# Patient Record
Sex: MALE | Race: WHITE | NOT HISPANIC OR LATINO | Employment: UNEMPLOYED | URBAN - METROPOLITAN AREA
[De-identification: names, ages, dates, MRNs, and addresses within clinical notes are randomized per-mention and may not be internally consistent; named-entity substitution may affect disease eponyms.]

---

## 2022-03-03 ENCOUNTER — APPOINTMENT (OUTPATIENT)
Dept: CARDIOLOGY | Facility: HOSPITAL | Age: 57
End: 2022-03-03

## 2022-03-03 ENCOUNTER — HOSPITAL ENCOUNTER (INPATIENT)
Facility: HOSPITAL | Age: 57
LOS: 2 days | Discharge: HOME | End: 2022-03-05
Attending: INTERNAL MEDICINE | Admitting: INTERNAL MEDICINE

## 2022-03-03 DIAGNOSIS — I21.11 ST ELEVATION MYOCARDIAL INFARCTION INVOLVING RIGHT CORONARY ARTERY: Primary | ICD-10-CM

## 2022-03-03 PROBLEM — Z72.0 TOBACCO ABUSE: Status: ACTIVE | Noted: 2022-03-03

## 2022-03-03 PROBLEM — I21.3 STEMI (ST ELEVATION MYOCARDIAL INFARCTION): Status: ACTIVE | Noted: 2022-03-03

## 2022-03-03 PROBLEM — E78.5 HYPERLIPIDEMIA LDL GOAL <70: Status: ACTIVE | Noted: 2022-03-03

## 2022-03-03 LAB
ACT BLD: 249 SECONDS (ref 82–152)
ACT BLD: 356 SECONDS (ref 82–152)
BASOPHILS # BLD AUTO: 0.03 10*3/MM3 (ref 0–0.2)
BASOPHILS NFR BLD AUTO: 0.2 % (ref 0–1.5)
BH CV ECHO MEAS - AO MAX PG (FULL): 4.9 MMHG
BH CV ECHO MEAS - AO MAX PG: 11 MMHG
BH CV ECHO MEAS - AO MEAN PG (FULL): 3.1 MMHG
BH CV ECHO MEAS - AO MEAN PG: 6.3 MMHG
BH CV ECHO MEAS - AO ROOT AREA (BSA CORRECTED): 1.5
BH CV ECHO MEAS - AO ROOT AREA: 8.7 CM^2
BH CV ECHO MEAS - AO ROOT DIAM: 3.3 CM
BH CV ECHO MEAS - AO V2 MAX: 166.1 CM/SEC
BH CV ECHO MEAS - AO V2 MEAN: 115.2 CM/SEC
BH CV ECHO MEAS - AO V2 VTI: 32 CM
BH CV ECHO MEAS - AVA(I,A): 2.5 CM^2
BH CV ECHO MEAS - AVA(I,D): 2.5 CM^2
BH CV ECHO MEAS - AVA(V,A): 2.4 CM^2
BH CV ECHO MEAS - AVA(V,D): 2.4 CM^2
BH CV ECHO MEAS - BSA(HAYCOCK): 2.3 M^2
BH CV ECHO MEAS - BSA: 2.2 M^2
BH CV ECHO MEAS - BZI_BMI: 29.3 KILOGRAMS/M^2
BH CV ECHO MEAS - BZI_METRIC_HEIGHT: 185.4 CM
BH CV ECHO MEAS - BZI_METRIC_WEIGHT: 100.7 KG
BH CV ECHO MEAS - EDV(CUBED): 155.8 ML
BH CV ECHO MEAS - EDV(MOD-SP2): 129 ML
BH CV ECHO MEAS - EDV(MOD-SP4): 128 ML
BH CV ECHO MEAS - EDV(TEICH): 140.2 ML
BH CV ECHO MEAS - EF(CUBED): 61 %
BH CV ECHO MEAS - EF(MOD-BP): 58.8 %
BH CV ECHO MEAS - EF(MOD-SP2): 66.1 %
BH CV ECHO MEAS - EF(MOD-SP4): 55.4 %
BH CV ECHO MEAS - EF(TEICH): 52.1 %
BH CV ECHO MEAS - ESV(CUBED): 60.7 ML
BH CV ECHO MEAS - ESV(MOD-SP2): 43.7 ML
BH CV ECHO MEAS - ESV(MOD-SP4): 57.1 ML
BH CV ECHO MEAS - ESV(TEICH): 67.1 ML
BH CV ECHO MEAS - FS: 27 %
BH CV ECHO MEAS - IVS/LVPW: 0.96
BH CV ECHO MEAS - IVSD: 0.76 CM
BH CV ECHO MEAS - LA DIMENSION: 3.8 CM
BH CV ECHO MEAS - LA/AO: 1.2
BH CV ECHO MEAS - LAD MAJOR: 5.6 CM
BH CV ECHO MEAS - LAT PEAK E' VEL: 10.9 CM/SEC
BH CV ECHO MEAS - LATERAL E/E' RATIO: 8.5
BH CV ECHO MEAS - LV DIASTOLIC VOL/BSA (35-75): 56.9 ML/M^2
BH CV ECHO MEAS - LV IVRT: 0.09 SEC
BH CV ECHO MEAS - LV MASS(C)D: 146.9 GRAMS
BH CV ECHO MEAS - LV MASS(C)DI: 65.3 GRAMS/M^2
BH CV ECHO MEAS - LV MAX PG: 6.1 MMHG
BH CV ECHO MEAS - LV MEAN PG: 3.2 MMHG
BH CV ECHO MEAS - LV SYSTOLIC VOL/BSA (12-30): 25.4 ML/M^2
BH CV ECHO MEAS - LV V1 MAX: 123.4 CM/SEC
BH CV ECHO MEAS - LV V1 MEAN: 82.9 CM/SEC
BH CV ECHO MEAS - LV V1 VTI: 24.8 CM
BH CV ECHO MEAS - LVIDD: 5.4 CM
BH CV ECHO MEAS - LVIDS: 3.9 CM
BH CV ECHO MEAS - LVLD AP2: 9.7 CM
BH CV ECHO MEAS - LVLD AP4: 9.6 CM
BH CV ECHO MEAS - LVLS AP2: 7.4 CM
BH CV ECHO MEAS - LVLS AP4: 8.4 CM
BH CV ECHO MEAS - LVOT AREA (M): 3.1 CM^2
BH CV ECHO MEAS - LVOT AREA: 3.3 CM^2
BH CV ECHO MEAS - LVOT DIAM: 2 CM
BH CV ECHO MEAS - LVPWD: 0.79 CM
BH CV ECHO MEAS - MED PEAK E' VEL: 8.7 CM/SEC
BH CV ECHO MEAS - MEDIAL E/E' RATIO: 10.7
BH CV ECHO MEAS - MV A MAX VEL: 64.3 CM/SEC
BH CV ECHO MEAS - MV DEC SLOPE: 539.2 CM/SEC^2
BH CV ECHO MEAS - MV DEC TIME: 0.17 SEC
BH CV ECHO MEAS - MV E MAX VEL: 93 CM/SEC
BH CV ECHO MEAS - MV E/A: 1.4
BH CV ECHO MEAS - PA ACC TIME: 0.07 SEC
BH CV ECHO MEAS - PA PR(ACCEL): 48.9 MMHG
BH CV ECHO MEAS - SI(AO): 124.3 ML/M^2
BH CV ECHO MEAS - SI(CUBED): 42.3 ML/M^2
BH CV ECHO MEAS - SI(LVOT): 36.1 ML/M^2
BH CV ECHO MEAS - SI(MOD-SP2): 37.9 ML/M^2
BH CV ECHO MEAS - SI(MOD-SP4): 31.5 ML/M^2
BH CV ECHO MEAS - SI(TEICH): 32.5 ML/M^2
BH CV ECHO MEAS - SV(AO): 279.5 ML
BH CV ECHO MEAS - SV(CUBED): 95.1 ML
BH CV ECHO MEAS - SV(LVOT): 81.2 ML
BH CV ECHO MEAS - SV(MOD-SP2): 85.3 ML
BH CV ECHO MEAS - SV(MOD-SP4): 70.9 ML
BH CV ECHO MEAS - SV(TEICH): 73 ML
BH CV ECHO MEAS - TAPSE (>1.6): 2.4 CM
BH CV ECHO MEASUREMENTS AVERAGE E/E' RATIO: 9.49
BH CV XLRA - RV BASE: 3.5 CM
BH CV XLRA - RV LENGTH: 6.4 CM
BH CV XLRA - RV MID: 2.7 CM
BH CV XLRA - TDI S': 9.1 CM/SEC
CATH EF ESTIMATED: 50 %
CHOLEST SERPL-MCNC: 259 MG/DL (ref 0–200)
CREAT BLDA-MCNC: 0.6 MG/DL (ref 0.6–1.3)
DEPRECATED RDW RBC AUTO: 44.4 FL (ref 37–54)
EOSINOPHIL # BLD AUTO: 0.01 10*3/MM3 (ref 0–0.4)
EOSINOPHIL NFR BLD AUTO: 0.1 % (ref 0.3–6.2)
ERYTHROCYTE [DISTWIDTH] IN BLOOD BY AUTOMATED COUNT: 14.1 % (ref 12.3–15.4)
HBA1C MFR BLD: 5.7 % (ref 4.8–5.6)
HCT VFR BLD AUTO: 41.1 % (ref 37.5–51)
HDLC SERPL-MCNC: 43 MG/DL (ref 40–60)
HGB BLD-MCNC: 14.3 G/DL (ref 13–17.7)
IMM GRANULOCYTES # BLD AUTO: 0.07 10*3/MM3 (ref 0–0.05)
IMM GRANULOCYTES NFR BLD AUTO: 0.4 % (ref 0–0.5)
LDLC SERPL CALC-MCNC: 195 MG/DL (ref 0–100)
LDLC/HDLC SERPL: 4.49 {RATIO}
LEFT ATRIUM VOLUME INDEX: 29.2 ML/M^2
LEFT ATRIUM VOLUME: 65.6 ML
LV EF 2D ECHO EST: 60 %
LYMPHOCYTES # BLD AUTO: 1.93 10*3/MM3 (ref 0.7–3.1)
LYMPHOCYTES NFR BLD AUTO: 11.8 % (ref 19.6–45.3)
MAGNESIUM SERPL-MCNC: 1.8 MG/DL (ref 1.6–2.6)
MAXIMAL PREDICTED HEART RATE: 162 BPM
MCH RBC QN AUTO: 29.9 PG (ref 26.6–33)
MCHC RBC AUTO-ENTMCNC: 34.8 G/DL (ref 31.5–35.7)
MCV RBC AUTO: 86 FL (ref 79–97)
MONOCYTES # BLD AUTO: 1.08 10*3/MM3 (ref 0.1–0.9)
MONOCYTES NFR BLD AUTO: 6.6 % (ref 5–12)
NEUTROPHILS NFR BLD AUTO: 13.29 10*3/MM3 (ref 1.7–7)
NEUTROPHILS NFR BLD AUTO: 80.9 % (ref 42.7–76)
NRBC BLD AUTO-RTO: 0 /100 WBC (ref 0–0.2)
PLATELET # BLD AUTO: 244 10*3/MM3 (ref 140–450)
PMV BLD AUTO: 11.2 FL (ref 6–12)
RBC # BLD AUTO: 4.78 10*6/MM3 (ref 4.14–5.8)
SARS-COV-2 RDRP RESP QL NAA+PROBE: NORMAL
STRESS TARGET HR: 138 BPM
TRIGL SERPL-MCNC: 114 MG/DL (ref 0–150)
TROPONIN T SERPL-MCNC: 6.12 NG/ML (ref 0–0.03)
VLDLC SERPL-MCNC: 21 MG/DL (ref 5–40)
WBC NRBC COR # BLD: 16.41 10*3/MM3 (ref 3.4–10.8)

## 2022-03-03 PROCEDURE — 0 IOPAMIDOL PER 1 ML: Performed by: INTERNAL MEDICINE

## 2022-03-03 PROCEDURE — 87635 SARS-COV-2 COVID-19 AMP PRB: CPT | Performed by: INTERNAL MEDICINE

## 2022-03-03 PROCEDURE — 25010000002 PHENYLEPHRINE 10 MG/ML SOLUTION: Performed by: INTERNAL MEDICINE

## 2022-03-03 PROCEDURE — 92979 ENDOLUMINL IVUS OCT C EA: CPT | Performed by: INTERNAL MEDICINE

## 2022-03-03 PROCEDURE — C1769 GUIDE WIRE: HCPCS | Performed by: INTERNAL MEDICINE

## 2022-03-03 PROCEDURE — C1894 INTRO/SHEATH, NON-LASER: HCPCS | Performed by: INTERNAL MEDICINE

## 2022-03-03 PROCEDURE — 83735 ASSAY OF MAGNESIUM: CPT | Performed by: INTERNAL MEDICINE

## 2022-03-03 PROCEDURE — 63710000001 ONDANSETRON PER 8 MG: Performed by: INTERNAL MEDICINE

## 2022-03-03 PROCEDURE — C1725 CATH, TRANSLUMIN NON-LASER: HCPCS | Performed by: INTERNAL MEDICINE

## 2022-03-03 PROCEDURE — C9606 PERC D-E COR REVASC W AMI S: HCPCS | Performed by: INTERNAL MEDICINE

## 2022-03-03 PROCEDURE — 85347 COAGULATION TIME ACTIVATED: CPT

## 2022-03-03 PROCEDURE — 80061 LIPID PANEL: CPT | Performed by: INTERNAL MEDICINE

## 2022-03-03 PROCEDURE — 92978 ENDOLUMINL IVUS OCT C 1ST: CPT | Performed by: INTERNAL MEDICINE

## 2022-03-03 PROCEDURE — 82565 ASSAY OF CREATININE: CPT

## 2022-03-03 PROCEDURE — 85025 COMPLETE CBC W/AUTO DIFF WBC: CPT | Performed by: INTERNAL MEDICINE

## 2022-03-03 PROCEDURE — 99223 1ST HOSP IP/OBS HIGH 75: CPT | Performed by: INTERNAL MEDICINE

## 2022-03-03 PROCEDURE — 83036 HEMOGLOBIN GLYCOSYLATED A1C: CPT | Performed by: INTERNAL MEDICINE

## 2022-03-03 PROCEDURE — 25010000002 MORPHINE PER 10 MG: Performed by: INTERNAL MEDICINE

## 2022-03-03 PROCEDURE — B2111ZZ FLUOROSCOPY OF MULTIPLE CORONARY ARTERIES USING LOW OSMOLAR CONTRAST: ICD-10-PCS | Performed by: INTERNAL MEDICINE

## 2022-03-03 PROCEDURE — 99232 SBSQ HOSP IP/OBS MODERATE 35: CPT | Performed by: INTERNAL MEDICINE

## 2022-03-03 PROCEDURE — C1887 CATHETER, GUIDING: HCPCS | Performed by: INTERNAL MEDICINE

## 2022-03-03 PROCEDURE — 93458 L HRT ARTERY/VENTRICLE ANGIO: CPT | Performed by: INTERNAL MEDICINE

## 2022-03-03 PROCEDURE — 25010000002 MIDAZOLAM PER 1 MG: Performed by: INTERNAL MEDICINE

## 2022-03-03 PROCEDURE — B221Z2Z COMPUTERIZED TOMOGRAPHY (CT SCAN) OF MULTIPLE CORONARY ARTERIES USING INTRAVASCULAR OPTICAL COHERENCE: ICD-10-PCS | Performed by: INTERNAL MEDICINE

## 2022-03-03 PROCEDURE — 25010000002 ATROPINE PER 0.01 MG: Performed by: INTERNAL MEDICINE

## 2022-03-03 PROCEDURE — 93010 ELECTROCARDIOGRAM REPORT: CPT | Performed by: INTERNAL MEDICINE

## 2022-03-03 PROCEDURE — 93306 TTE W/DOPPLER COMPLETE: CPT | Performed by: INTERNAL MEDICINE

## 2022-03-03 PROCEDURE — C1874 STENT, COATED/COV W/DEL SYS: HCPCS | Performed by: INTERNAL MEDICINE

## 2022-03-03 PROCEDURE — 25010000002 FENTANYL CITRATE (PF) 50 MCG/ML SOLUTION: Performed by: INTERNAL MEDICINE

## 2022-03-03 PROCEDURE — 92928 PRQ TCAT PLMT NTRAC ST 1 LES: CPT | Performed by: INTERNAL MEDICINE

## 2022-03-03 PROCEDURE — 92941 PRQ TRLML REVSC TOT OCCL AMI: CPT | Performed by: INTERNAL MEDICINE

## 2022-03-03 PROCEDURE — C1753 CATH, INTRAVAS ULTRASOUND: HCPCS | Performed by: INTERNAL MEDICINE

## 2022-03-03 PROCEDURE — 4A023N7 MEASUREMENT OF CARDIAC SAMPLING AND PRESSURE, LEFT HEART, PERCUTANEOUS APPROACH: ICD-10-PCS | Performed by: INTERNAL MEDICINE

## 2022-03-03 PROCEDURE — 25010000002 HEPARIN (PORCINE) PER 1000 UNITS: Performed by: INTERNAL MEDICINE

## 2022-03-03 PROCEDURE — 93005 ELECTROCARDIOGRAM TRACING: CPT | Performed by: INTERNAL MEDICINE

## 2022-03-03 PROCEDURE — 0 MAGNESIUM SULFATE 4 GM/100ML SOLUTION: Performed by: INTERNAL MEDICINE

## 2022-03-03 PROCEDURE — 93306 TTE W/DOPPLER COMPLETE: CPT

## 2022-03-03 PROCEDURE — 027137Z DILATION OF CORONARY ARTERY, TWO ARTERIES WITH FOUR OR MORE DRUG-ELUTING INTRALUMINAL DEVICES, PERCUTANEOUS APPROACH: ICD-10-PCS | Performed by: INTERNAL MEDICINE

## 2022-03-03 PROCEDURE — 84484 ASSAY OF TROPONIN QUANT: CPT | Performed by: INTERNAL MEDICINE

## 2022-03-03 PROCEDURE — C9600 PERC DRUG-EL COR STENT SING: HCPCS | Performed by: INTERNAL MEDICINE

## 2022-03-03 DEVICE — XIENCE SKYPOINT™ EVEROLIMUS ELUTING CORONARY STENT SYSTEM 4.00 MM X 38 MM / RAPID-EXCHANGE
Type: IMPLANTABLE DEVICE | Status: FUNCTIONAL
Brand: XIENCE SKYPOINT™

## 2022-03-03 DEVICE — XIENCE SKYPOINT™ EVEROLIMUS ELUTING CORONARY STENT SYSTEM 3.50 MM X 23 MM / RAPID-EXCHANGE
Type: IMPLANTABLE DEVICE | Status: FUNCTIONAL
Brand: XIENCE SKYPOINT™

## 2022-03-03 DEVICE — XIENCE SKYPOINT™ EVEROLIMUS ELUTING CORONARY STENT SYSTEM 3.25 MM X 38 MM / RAPID-EXCHANGE
Type: IMPLANTABLE DEVICE | Status: FUNCTIONAL
Brand: XIENCE SKYPOINT™

## 2022-03-03 RX ORDER — MIDAZOLAM HYDROCHLORIDE 1 MG/ML
INJECTION INTRAMUSCULAR; INTRAVENOUS AS NEEDED
Status: DISCONTINUED | OUTPATIENT
Start: 2022-03-03 | End: 2022-03-03 | Stop reason: HOSPADM

## 2022-03-03 RX ORDER — POTASSIUM CHLORIDE 1.5 G/1.77G
40 POWDER, FOR SOLUTION ORAL AS NEEDED
Status: DISCONTINUED | OUTPATIENT
Start: 2022-03-03 | End: 2022-03-05 | Stop reason: HOSPADM

## 2022-03-03 RX ORDER — BISACODYL 5 MG/1
5 TABLET, DELAYED RELEASE ORAL DAILY PRN
Status: DISCONTINUED | OUTPATIENT
Start: 2022-03-03 | End: 2022-03-05 | Stop reason: HOSPADM

## 2022-03-03 RX ORDER — POTASSIUM CHLORIDE 750 MG/1
40 CAPSULE, EXTENDED RELEASE ORAL AS NEEDED
Status: DISCONTINUED | OUTPATIENT
Start: 2022-03-03 | End: 2022-03-05 | Stop reason: HOSPADM

## 2022-03-03 RX ORDER — ATORVASTATIN CALCIUM 40 MG/1
80 TABLET, FILM COATED ORAL NIGHTLY
Status: DISCONTINUED | OUTPATIENT
Start: 2022-03-03 | End: 2022-03-05 | Stop reason: HOSPADM

## 2022-03-03 RX ORDER — VALSARTAN 80 MG/1
40 TABLET ORAL DAILY
Status: DISCONTINUED | OUTPATIENT
Start: 2022-03-03 | End: 2022-03-05 | Stop reason: HOSPADM

## 2022-03-03 RX ORDER — LISINOPRIL 10 MG/1
10 TABLET ORAL DAILY
COMMUNITY
End: 2022-04-26 | Stop reason: ALTCHOICE

## 2022-03-03 RX ORDER — ACETAMINOPHEN 325 MG/1
650 TABLET ORAL EVERY 4 HOURS PRN
Status: DISCONTINUED | OUTPATIENT
Start: 2022-03-03 | End: 2022-03-05 | Stop reason: HOSPADM

## 2022-03-03 RX ORDER — HYDROCODONE BITARTRATE AND ACETAMINOPHEN 7.5; 325 MG/1; MG/1
1 TABLET ORAL EVERY 4 HOURS PRN
Status: DISCONTINUED | OUTPATIENT
Start: 2022-03-03 | End: 2022-03-05 | Stop reason: HOSPADM

## 2022-03-03 RX ORDER — ONDANSETRON 4 MG/1
4 TABLET, FILM COATED ORAL EVERY 6 HOURS PRN
Status: DISCONTINUED | OUTPATIENT
Start: 2022-03-03 | End: 2022-03-05 | Stop reason: HOSPADM

## 2022-03-03 RX ORDER — POLYETHYLENE GLYCOL 3350 17 G/17G
17 POWDER, FOR SOLUTION ORAL DAILY PRN
Status: DISCONTINUED | OUTPATIENT
Start: 2022-03-03 | End: 2022-03-05 | Stop reason: HOSPADM

## 2022-03-03 RX ORDER — ASPIRIN 81 MG/1
81 TABLET ORAL DAILY
Status: DISCONTINUED | OUTPATIENT
Start: 2022-03-03 | End: 2022-03-05 | Stop reason: HOSPADM

## 2022-03-03 RX ORDER — BISACODYL 10 MG
10 SUPPOSITORY, RECTAL RECTAL DAILY PRN
Status: DISCONTINUED | OUTPATIENT
Start: 2022-03-03 | End: 2022-03-05 | Stop reason: HOSPADM

## 2022-03-03 RX ORDER — AMOXICILLIN 250 MG
2 CAPSULE ORAL 2 TIMES DAILY
Status: DISCONTINUED | OUTPATIENT
Start: 2022-03-03 | End: 2022-03-05 | Stop reason: HOSPADM

## 2022-03-03 RX ORDER — SODIUM CHLORIDE 0.9 % (FLUSH) 0.9 %
10 SYRINGE (ML) INJECTION AS NEEDED
Status: DISCONTINUED | OUTPATIENT
Start: 2022-03-03 | End: 2022-03-05 | Stop reason: HOSPADM

## 2022-03-03 RX ORDER — MORPHINE SULFATE 2 MG/ML
2 INJECTION, SOLUTION INTRAMUSCULAR; INTRAVENOUS EVERY 4 HOURS PRN
Status: DISCONTINUED | OUTPATIENT
Start: 2022-03-03 | End: 2022-03-05 | Stop reason: HOSPADM

## 2022-03-03 RX ORDER — HEPARIN SODIUM 1000 [USP'U]/ML
INJECTION, SOLUTION INTRAVENOUS; SUBCUTANEOUS AS NEEDED
Status: DISCONTINUED | OUTPATIENT
Start: 2022-03-03 | End: 2022-03-03 | Stop reason: HOSPADM

## 2022-03-03 RX ORDER — FENTANYL CITRATE 50 UG/ML
INJECTION, SOLUTION INTRAMUSCULAR; INTRAVENOUS AS NEEDED
Status: DISCONTINUED | OUTPATIENT
Start: 2022-03-03 | End: 2022-03-03 | Stop reason: HOSPADM

## 2022-03-03 RX ORDER — PHENYLEPHRINE HYDROCHLORIDE 10 MG/ML
INJECTION INTRAVENOUS AS NEEDED
Status: DISCONTINUED | OUTPATIENT
Start: 2022-03-03 | End: 2022-03-03 | Stop reason: HOSPADM

## 2022-03-03 RX ORDER — SODIUM CHLORIDE 0.9 % (FLUSH) 0.9 %
10 SYRINGE (ML) INJECTION EVERY 12 HOURS SCHEDULED
Status: DISCONTINUED | OUTPATIENT
Start: 2022-03-03 | End: 2022-03-05 | Stop reason: HOSPADM

## 2022-03-03 RX ORDER — MAGNESIUM SULFATE HEPTAHYDRATE 40 MG/ML
2 INJECTION, SOLUTION INTRAVENOUS AS NEEDED
Status: DISCONTINUED | OUTPATIENT
Start: 2022-03-03 | End: 2022-03-05 | Stop reason: HOSPADM

## 2022-03-03 RX ORDER — ATROPINE SULFATE 1 MG/ML
INJECTION, SOLUTION INTRAMUSCULAR; INTRAVENOUS; SUBCUTANEOUS AS NEEDED
Status: DISCONTINUED | OUTPATIENT
Start: 2022-03-03 | End: 2022-03-03 | Stop reason: HOSPADM

## 2022-03-03 RX ORDER — LIDOCAINE HYDROCHLORIDE 10 MG/ML
INJECTION, SOLUTION EPIDURAL; INFILTRATION; INTRACAUDAL; PERINEURAL AS NEEDED
Status: DISCONTINUED | OUTPATIENT
Start: 2022-03-03 | End: 2022-03-03 | Stop reason: HOSPADM

## 2022-03-03 RX ORDER — MAGNESIUM SULFATE HEPTAHYDRATE 40 MG/ML
4 INJECTION, SOLUTION INTRAVENOUS AS NEEDED
Status: DISCONTINUED | OUTPATIENT
Start: 2022-03-03 | End: 2022-03-05 | Stop reason: HOSPADM

## 2022-03-03 RX ORDER — ONDANSETRON 2 MG/ML
4 INJECTION INTRAMUSCULAR; INTRAVENOUS EVERY 6 HOURS PRN
Status: DISCONTINUED | OUTPATIENT
Start: 2022-03-03 | End: 2022-03-05 | Stop reason: HOSPADM

## 2022-03-03 RX ADMIN — TICAGRELOR 90 MG: 90 TABLET ORAL at 12:20

## 2022-03-03 RX ADMIN — MAGNESIUM SULFATE HEPTAHYDRATE 4 G: 40 INJECTION, SOLUTION INTRAVENOUS at 14:46

## 2022-03-03 RX ADMIN — Medication 10 ML: at 12:21

## 2022-03-03 RX ADMIN — MORPHINE SULFATE 2 MG: 2 INJECTION, SOLUTION INTRAMUSCULAR; INTRAVENOUS at 08:32

## 2022-03-03 RX ADMIN — TICAGRELOR 90 MG: 90 TABLET ORAL at 21:27

## 2022-03-03 RX ADMIN — ASPIRIN 81 MG: 81 TABLET, COATED ORAL at 12:20

## 2022-03-03 RX ADMIN — ATORVASTATIN CALCIUM 80 MG: 40 TABLET, FILM COATED ORAL at 21:27

## 2022-03-03 RX ADMIN — ONDANSETRON HYDROCHLORIDE 4 MG: 4 TABLET, FILM COATED ORAL at 12:36

## 2022-03-03 RX ADMIN — ACETAMINOPHEN 650 MG: 325 TABLET, FILM COATED ORAL at 12:24

## 2022-03-03 RX ADMIN — SENNOSIDES AND DOCUSATE SODIUM 2 TABLET: 50; 8.6 TABLET ORAL at 21:27

## 2022-03-03 NOTE — PROGRESS NOTES
LIZBETH Samson   APRABDELRAHMAN NOTE    Ritesh Quevedo is a 56 y.o. male who began having substernal chest pain on 3/2/22 which he contributed to gas pain and did not seek care at that time. He woke up on 3/3/22 with crushing and burning chest pain that was worse, rated 7/10 and associated with nausea and vomiting. He presented to the ED at [facility] where EKG showed inferior wall MI.  Code AMI was activated and the patient was flown to [facility] and taken emergently to the cath lab where he underwent PCI with overlapping KYUNG to the distal RCA, mid RCA, proximal RCA, and proximal ramus. LVEF was 50% with inferior akinesis. He was started on a Heparin gtt and given a dose of Brilinta.     He denied dizziness, fatigue, SOA, or changes in functional abilities.  He smokes 1/2 pack a day since he was 14 years old and admits to drinking 1 shot of whiskey every weekend. His father and maternal grandparents had cardiac disease.     On arrival to ICU he is alert, oriented and denies chest pain, nausea, or shortness of air. He has a TR band in place on the right wrist. Right hand and fingers appear dusky with no palpable radial pulse and cap refill is prolonged at > 5 seconds. TR band decreased by 2 mL of air with pulse regained and hand color/cap refill improving but site began to bleed. 1 mL of air replaced with cessation of bleeding and pulse remains palpable with adequate cap refill.      []   Information obtained by review of electronic health records.(non-face-to-face)  [x]   Information obtained by face-to-face contact with the patient.      INTENSIVIST   HOSPITAL VISIT NOTE     Hospital:  LOS: 0 days        XAVIER Awad, 56 y.o. male is followed for:No chief complaint on file.       STEMI (ST elevation myocardial infarction) (HCC)    Hyperlipidemia LDL goal <70    Tobacco abuse    As an Intensivist, we provide an integrated approach to the ICU patient and family, medical management of  comorbid conditions, including but not limited to electrolytes, glycemic control, organ dysfunction, lead interdisciplinary rounds and coordinate the care with all other services, including those from other specialists.     HPI:  POD: Day of Surgery     Ritesh is a 56 y.o. male, who was transferred to this Hospital from [facility]  on 3/3/2022 because of AMI.     His initial evaluation at the ED suggested and inferior wall MI.    He started having Chest pain this morning.    He is a smoker.    For further details see note above.    I saw him in 2B ICU after his LHC. He is doing much better, still some soreness on his chest. No respiratory distress.    The patient has started, but not completed, their COVID-19 vaccination series.        PMH: He  has a past medical history of Hypertension.   PSxH: He  has a past surgical history that includes Knee surgery.     Medications:  No current facility-administered medications on file prior to encounter.     Current Outpatient Medications on File Prior to Encounter   Medication Sig   • lisinopril (PRINIVIL,ZESTRIL) 10 MG tablet Take 10 mg by mouth Daily.        Allergies: He has No Known Allergies.   FH: His family history includes Heart disease in his father, paternal grandfather, and paternal grandmother.   SH: He  reports that he has been smoking cigarettes. He has a 21.00 pack-year smoking history. He has never used smokeless tobacco. He reports current alcohol use of about 1.0 standard drink of alcohol per week. He reports that he does not use drugs.     The patient's relevant past medical, surgical and social history were reviewed and updated in Epic as appropriate.        History     Last Reviewed by Zafar Dukes MD on 3/3/2022 at  4:59 PM    Sections Reviewed    Medical, Family, Surgical, Tobacco, Alcohol, Drug Use, Sexual Activity,   Social Documentation      Problem list reviewed by Zafar Dukes MD on 3/3/2022 at  4:59 PM  Medicines reviewed by  Zafar Dukes MD on 3/3/2022 at  4:59 PM  Allergies reviewed by Zafar Dukes MD on 3/3/2022 at  4:59 PM       Review of Systems  As described in the HPI. All other systems reviewed and are negative.       O     Vitals:  Temp: 98.4 °F (36.9 °C) (03/03/22 1615) Temp  Min: 98 °F (36.7 °C)  Max: 98.4 °F (36.9 °C)   Temp core:      BP: 114/77 (03/03/22 1615) BP  Min: 90/63  Max: 156/88   Pulse: 76 (03/03/22 1615) Pulse  Min: 50  Max: 85   Resp: 12 (03/03/22 1615) Resp  Min: 12  Max: 20   SpO2: 95 % (03/03/22 1615) SpO2  Min: 89 %  Max: 98 %   Device: nasal cannula (03/03/22 1615)    Flow Rate: 2 (03/03/22 1615) Flow (L/min)  Min: 2  Max: 3     Intake/Ouptut 24 hrs (7:00AM - 6:59 AM)    Intake/Output Summary (Last 24 hours) at 3/3/2022 1659  Last data filed at 3/3/2022 1116  Gross per 24 hour   Intake --   Output 200 ml   Net -200 ml        Physical Examination    Telemetry:  Rhythm: sinus bradycardia (03/03/22 1600)         Constitutional:  No acute distress.   Head: Normocephalic.   ENMT: No oral lesions.   Neck: No adenopathy. Supple.  Trachea midline.   Cardiovascular: RRR.   Normal heart sounds.  No murmurs, gallop or rub.   Respiratory: Normal breath sounds  No adventitious sounds.   Abdominal:  Soft with no tenderness.  No distension.   No HSM.   Extremities: Warm.  Dry.  No cyanosis.  No Edema   Neurological/Psych:   Alert, Oriented, Cooperative.  Best Eye Response: 4-->(E4) spontaneous (03/03/22 1600)  Best Motor Response: 6-->(M6) obeys commands (03/03/22 1600)  Best Verbal Response: 5-->(V5) oriented (03/03/22 1600)  Lorman Coma Scale Score: 15 (03/03/22 1600)     Results Reviewed:  Laboratory  Microbiology  Radiology  Pathology    Hematology:        Chemistry:  Estimated Creatinine Clearance: 169.5 mL/min (by C-G formula based on SCr of 0.6 mg/dL).  Results from last 7 days   Lab Units 03/03/22  0843   CREATININE mg/dL 0.60     Results from last 7 days   Lab Units 03/03/22  1205   MAGNESIUM mg/dL 1.8      Cardiac Labs:  Results from last 7 days   Lab Units 03/03/22  1205   TROPONIN T ng/mL 6.120*     COVID-19  Lab Results   Lab Value Date/Time    COVID19 Presumptive Negative 03/03/2022 0857     Images:  No radiology results for the last day    Echo:    Results: Reviewed.  I reviewed the patient's new laboratory and imaging results.  I independently reviewed the patient's new images.    Medications: Reviewed.    Assessment/Plan   A / P     Ritesh is a 56 y.o. male admitted on 3/3/2022 with ST elevation myocardial infarction involving right coronary artery (HCC) [I21.11]:     STEMI  [facility] 03/03/22: Severe 2-vessel CAD (RCA, ramus).    Successful PCI of proximal to distal RCA and RPL using overlapping Xience KYUNG.    Successful PCI of ramus branch with Xience 3.5 x 23 mm KYUNG  HTN on ACEI  Dyslipidemia on statins    Lab Results   Lab Value Date/Time    CHOL 259 (H) 03/03/2022 1205    HDL 43 03/03/2022 1205     (H) 03/03/2022 1205    TRIG 114 03/03/2022 1205      Tobacco use  No history of DM        No results found for: HGBA1C    Advance Directives: Code Status and Medical Interventions:   Ordered at: 03/03/22 1008     Code Status (Patient has no pulse and is not breathing):    CPR (Attempt to Resuscitate)     Medical Interventions (Patient has pulse or is breathing):    Full Support        Plan:    ICU admission after [facility].  Monitor arrhythmias.  Management of Electrolytes Disorders  Tobacco Cessation  A1c and TSH in AM  Disposition: Keep in ICU.   Labs in AM    Plan of care and goals reviewed during interdisciplinary rounds.  I discussed the patient's findings and my recommendations with patient    Level of Risk is High due to:  illness with threat to life or bodily function.     Time: 50 minutes, in direct patient care, with the patient and/or on the loomis coordinating care with other health care providers.     I have spent > 50% percent of this time, counseling and discussing management.     []  Primary  Attending  [x]  Consultant

## 2022-03-03 NOTE — H&P
Cardiology H & P  [facility] Cardiology      Reason for consultation:  · CODE AMI    Requesting provider: [facility]    IDENTIFICATION: 56 year old from [facility] Problems    Diagnosis  POA   • **STEMI (ST elevation myocardial infarction) (HCC) [I21.3]  Yes     Priority: High   • Hyperlipidemia LDL goal <70 [E78.5]  Unknown              56-year-old gentleman with no prior cardiac history presented to [facility] with 24 hours of substernal chest discomfort.  Discomfort was most notable in his throat and upper chest.  He developed nausea and vomiting.  He presented to [facility] with inferior ST elevation.  Code AMI was activated.  The patient was flown directly to the cardiac catheterization lab at [facility] with ongoing pain, nausea, vomiting but hemodynamically stable.    Cardiac catheterization was performed via the right radial approach.  He was found to have occlusion of the distal RCA which was treated with multiple overlapping drug-eluting stents from proximal to distal vessel.  Additionally, he had a high-grade stenosis of a large ramus intermediate branch.  This was treated with a drug-eluting stent as well.  LVEF 50% with inferior akinesis.        Not on File    Prior to Admission medications    Not on File       History reviewed. No pertinent past medical history.    History reviewed. No pertinent surgical history.    History reviewed. No pertinent family history.    Social History     Tobacco Use   Smoking Status Not on file   Smokeless Tobacco Not on file       Social History     Substance and Sexual Activity   Alcohol Use None         Review of Systems:   Review of Systems   Unable to perform ROS: acuity of condition   Cardiovascular: Positive for chest pain.   Gastrointestinal: Positive for nausea and vomiting.            Vital Sign Min/Max for last 24 hours  No data recorded   BP  Min: 140/86  Max: 140/86   Pulse  Min: 75  Max: 75   Resp  Min: 20  Max: 20    SpO2  Min: 98 %  Max: 98 %   Flow (L/min)  Min: 3  Max: 3    No intake or output data in the 24 hours ending 03/03/22 0958          Constitutional:       Appearance: Healthy appearance. Obese.   Eyes:      General: Lids are normal. No scleral icterus.     Conjunctiva/sclera: Conjunctivae normal.   HENT:      Head: Normocephalic and atraumatic.   Neck:      Thyroid: No thyromegaly.      Vascular: No carotid bruit or JVD.   Pulmonary:      Effort: Pulmonary effort is normal.      Breath sounds: Normal breath sounds. No wheezing. No rhonchi. No rales.   Cardiovascular:      Normal rate. Regular rhythm.      Murmurs: There is no murmur.      No gallop. No rub.   Pulses:     Intact distal pulses.   Edema:     Peripheral edema absent.   Abdominal:      General: There is no distension.      Palpations: Abdomen is soft. There is no abdominal mass.   Musculoskeletal:      Cervical back: Normal range of motion. Skin:     General: Skin is warm and dry.      Findings: No rash.   Neurological:      General: No focal deficit present.      Mental Status: Alert and oriented to person, place, and time.      Gait: Gait is intact.   Psychiatric:         Attention and Perception: Attention normal.         Mood and Affect: Mood normal.         Behavior: Behavior normal.          Tele: Sinus    DATA REVIEW:    EKG (3/3/2022): Sinus with inferior ST elevation                               Inferior STEMI  · Status post PCI of RCA as well as ramus intermediate branch  · LVEF 50%    Hyperlipidemia goal LDL <70  · We will start atorvastatin 80 mg nightly                 · Admit to ICU  · Metoprolol 25 mg twice daily  · Valsartan 40 mg daily  · Low-dose aspirin and ticagrelor until 3/2023      Electronically signed by Jean Cooper IV, MD, 03/03/22, 7:16 AM EST.

## 2022-03-03 NOTE — PLAN OF CARE
Goal Outcome Evaluation:              Outcome Summary: Pt VSS throughout shift. Pt was asymptomatically bradycardic, provider notified. Chest pain has progressively improved this shift, now verbalized a 0-1. Radial site CDI. All questions and concerns adressed at bedside.

## 2022-03-04 LAB
ANION GAP SERPL CALCULATED.3IONS-SCNC: 11 MMOL/L (ref 5–15)
BUN SERPL-MCNC: 10 MG/DL (ref 6–20)
BUN/CREAT SERPL: 10.4 (ref 7–25)
CALCIUM SPEC-SCNC: 9.3 MG/DL (ref 8.6–10.5)
CHLORIDE SERPL-SCNC: 98 MMOL/L (ref 98–107)
CO2 SERPL-SCNC: 26 MMOL/L (ref 22–29)
CREAT SERPL-MCNC: 0.96 MG/DL (ref 0.76–1.27)
EGFRCR SERPLBLD CKD-EPI 2021: 91.6 ML/MIN/1.73
GLUCOSE SERPL-MCNC: 109 MG/DL (ref 65–99)
MAGNESIUM SERPL-MCNC: 2.4 MG/DL (ref 1.6–2.6)
POTASSIUM SERPL-SCNC: 4.2 MMOL/L (ref 3.5–5.2)
SODIUM SERPL-SCNC: 135 MMOL/L (ref 136–145)
TSH SERPL DL<=0.05 MIU/L-ACNC: 0.77 UIU/ML (ref 0.27–4.2)

## 2022-03-04 PROCEDURE — 99231 SBSQ HOSP IP/OBS SF/LOW 25: CPT | Performed by: INTERNAL MEDICINE

## 2022-03-04 PROCEDURE — 83735 ASSAY OF MAGNESIUM: CPT | Performed by: INTERNAL MEDICINE

## 2022-03-04 PROCEDURE — 80048 BASIC METABOLIC PNL TOTAL CA: CPT | Performed by: INTERNAL MEDICINE

## 2022-03-04 PROCEDURE — 84443 ASSAY THYROID STIM HORMONE: CPT | Performed by: INTERNAL MEDICINE

## 2022-03-04 PROCEDURE — 99232 SBSQ HOSP IP/OBS MODERATE 35: CPT | Performed by: PHYSICIAN ASSISTANT

## 2022-03-04 PROCEDURE — 93005 ELECTROCARDIOGRAM TRACING: CPT | Performed by: PHYSICIAN ASSISTANT

## 2022-03-04 RX ADMIN — TICAGRELOR 90 MG: 90 TABLET ORAL at 08:37

## 2022-03-04 RX ADMIN — TICAGRELOR 90 MG: 90 TABLET ORAL at 20:29

## 2022-03-04 RX ADMIN — METOPROLOL TARTRATE 25 MG: 25 TABLET, FILM COATED ORAL at 20:29

## 2022-03-04 RX ADMIN — METOPROLOL TARTRATE 25 MG: 25 TABLET, FILM COATED ORAL at 09:55

## 2022-03-04 RX ADMIN — SENNOSIDES AND DOCUSATE SODIUM 2 TABLET: 50; 8.6 TABLET ORAL at 08:37

## 2022-03-04 RX ADMIN — SENNOSIDES AND DOCUSATE SODIUM 2 TABLET: 50; 8.6 TABLET ORAL at 20:29

## 2022-03-04 RX ADMIN — ASPIRIN 81 MG: 81 TABLET, COATED ORAL at 08:37

## 2022-03-04 RX ADMIN — VALSARTAN 40 MG: 80 TABLET, FILM COATED ORAL at 08:39

## 2022-03-04 RX ADMIN — ATORVASTATIN CALCIUM 80 MG: 40 TABLET, FILM COATED ORAL at 20:28

## 2022-03-04 NOTE — PROGRESS NOTES
" [facility] at [facility] - Progress Note    Ritesh Quevedo  1965  [id number]    03/04/22, 08:43 EST      Chief Complaint: Following for CAD.  S/P Inferior MI with PCI of RCA    Subjective:   Patient remains in ICU, stable overnight without symptoms.  Sitting up in bed - has not been up ambulating yet.  Denies chest pain, denies dyspnea.  Not craving cigarettes at this time.  Asking about work release/forms    Review of Systems:  Pertinent positives are listed above and in physical exam.  All others have been reviewed and are negative.    aspirin, 81 mg, Oral, Daily  atorvastatin, 80 mg, Oral, Nightly  metoprolol tartrate, 25 mg, Oral, Q12H  pharmacy consult - MTM, , Does not apply, Daily  Pharmacy Meds to Bed Consult, , Does not apply, Daily  senna-docusate sodium, 2 tablet, Oral, BID  sodium chloride, 10 mL, Intravenous, Q12H  ticagrelor, 90 mg, Oral, BID  valsartan, 40 mg, Oral, Daily        Objective:  Vitals:   height is 187.2 cm (73.7\") and weight is 101 kg (222 lb 10.6 oz). His oral temperature is 98.3 °F (36.8 °C). His blood pressure is 114/70 and his pulse is 82. His respiration is 16 and oxygen saturation is 91%.       Intake/Output Summary (Last 24 hours) at 3/4/2022 0843  Last data filed at 3/4/2022 0500  Gross per 24 hour   Intake 840 ml   Output 1400 ml   Net -560 ml       Physical Exam:  General:  WN, NAD, A and O x3.  CV:  Normal S1,S2. No murmur, rub, or gallop.  Resp:  CTA Chris, equal, nonlabored.  Abd:  Soft, + BS, no organomegaly. Nontender to palpation.  Extrem:  No edema BLE, 2+ pedal/PT pulses.   Right wrist stable without erythema/bruising.  Dressing personally removed.         Results from last 7 days   Lab Units 03/03/22  1442   WBC 10*3/mm3 16.41*   HEMOGLOBIN g/dL 14.3   HEMATOCRIT % 41.1   PLATELETS 10*3/mm3 244     Results from last 7 days   Lab Units 03/04/22  0425   SODIUM mmol/L 135*   POTASSIUM mmol/L 4.2   CHLORIDE mmol/L 98   CO2 mmol/L 26.0   BUN mg/dL 10 "   CREATININE mg/dL 0.96   CALCIUM mg/dL 9.3   GLUCOSE mg/dL 109*         Results from last 7 days   Lab Units 03/04/22  0425   TSH uIU/mL 0.767     Results from last 7 days   Lab Units 03/03/22  1205   CHOLESTEROL mg/dL 259*   TRIGLYCERIDES mg/dL 114   HDL CHOL mg/dL 43   LDL CHOL mg/dL 195*           Tele:  NSR    Assessment and Plan:  1.  Inferior MI   -  57 yo CM presents with chest pain, inferior MI by EKG.  S/P successful PCI of distal RCA and RI. EF 50%.   -  Continue DAPT with low dose ASA, Brilinta for a year.  Continue high intensity statin, beta blocker, ARB.   -  Will keep another 24 hours and hopefully home tomorrow.  Per patient request, he would like to follow up with one of our physicians in [facility]  - will arrange 1 month follow up with EKG, Dr. Soto.   -  Cardiac Rehab to consult with patient today.  Ambulate.   -  Gave pt our office number/RN info to call with work related paperwork.      2.  Hyperlipidemia   -  Lipid panel personally reviewed.   -  Continue high intensity statin and appropriate diet.    3. Tobacco Abuse   -  Cessation education to be provided.   -  At risk for readmission      I discussed the patient's findings and my recommendations with the patient, any present family members, and the nursing staff.       Dorothy Moody PA-C  03/04/22, 08:43 EST  Electronically signed by IRAM Goldsmith, 03/04/22, 9:18 AM EST.

## 2022-03-04 NOTE — CASE MANAGEMENT/SOCIAL WORK
Discharge Planning Assessment  [facility]     Patient Name: Ritesh Quevedo  MRN: [id number]  Today's Date: 3/4/2022    Admit Date: 3/3/2022     Discharge Needs Assessment     Row Name 03/04/22 1114       Living Environment    Lives With alone    Current Living Arrangements home/apartment/condo  Lives in [place]    Primary Care Provided by self    Provides Primary Care For no one    Family Caregiver if Needed sibling(s)    Family Caregiver Names Tonie-sister    Quality of Family Relationships helpful; involved; supportive    Able to Return to Prior Arrangements yes       Resource/Environmental Concerns    Resource/Environmental Concerns none    Transportation Concerns car, none       Transition Planning    Patient/Family Anticipates Transition to home with family    Patient/Family Anticipated Services at Transition none    Transportation Anticipated family or friend will provide       Discharge Needs Assessment    Equipment Currently Used at Home none    Concerns to be Addressed discharge planning    Anticipated Changes Related to Illness none    Equipment Needed After Discharge none    Current Discharge Risk lives alone               Discharge Plan     Row Name 03/04/22 1115       Plan    Plan Home    Patient/Family in Agreement with Plan yes    Plan Comments Met with patient and his sister at . Pt lives alone in [facility]. Ind of ADL's and employed. He does not use DME or HH. He does not currently have a PCP and agreeable for CM to arrange. Called and made an appt for 3/11 with Dr. Correa at 12:45 pm, placed in AVS. Pt has no further needs. Family will transport. Michelle ext. [phone]    Final Discharge Disposition Code 01 - home or self-care              Continued Care and Services - Admitted Since 3/3/2022    Coordination has not been started for this encounter.       Expected Discharge Date and Time     Expected Discharge Date Expected Discharge Time    Mar 5, 2022          Demographic Summary      Row Name 03/04/22 1111       General Information    Referral Source admission list    Preferred Language English     Used During This Interaction no    General Information Comments No PCP. No POA. Wants his sister Tonie Joyner to make decisions if needed. Declines healthcare surrogate assistance at this time.       Contact Information    Permission Granted to Share Info With ; family/designee  Sister Tonie               Functional Status     Row Name 03/04/22 1113       Functional Status    Usual Activity Tolerance good    Current Activity Tolerance other (see comments)  See PT notes       Functional Status, IADL    Medications independent    Meal Preparation independent    Housekeeping independent    Laundry independent    Shopping independent       Employment/    Employment Status employed full-time    Employment/ Comments Has [employer] insurance. No issues affording meds.               Psychosocial    No documentation.                Abuse/Neglect    No documentation.                Legal    No documentation.                Substance Abuse    No documentation.                Patient Forms    No documentation.                   Michelle Winston RN

## 2022-03-04 NOTE — PAYOR COMM NOTE
"Ref # [id number]  Kavitha Charles RN, BSN, CMGT-BC  Phone # [phone]  Fax # [phone]  Ritesh Quevedo (56 y.o. Male)             Date of Birth Social Security Number Address Home Phone MRN    1965  [address]  [facility] [place] [address] [phone] [id number]    Mormon Marital Status             None Single       Admission Date Admission Type Admitting Provider Attending Provider Department, Room/Bed    3/3/22 Elective Jean Cooper IV, MD [place], Henry Charles T IV, MD [facility] 2B ICU, N232/1    Discharge Date Discharge Disposition Discharge Destination                         Attending Provider: Jean Cooper IV, MD    Allergies: No Known Allergies    Isolation: None   Infection: None   Code Status: CPR   Advance Care Planning Activity    Ht: 187.2 cm (73.7\")   Wt: 101 kg (222 lb 10.6 oz)    Admission Cmt: None   Principal Problem: STEMI (ST elevation myocardial infarction) (HCC) [I21.3] More...                 Active Insurance as of 3/3/2022     Primary Coverage     Payor Plan Insurance Group Employer/Plan Group    [employer]ERCIAL AETNA [id number]     Payor Plan Address Payor Plan Phone Number Payor Plan Fax Number Effective Dates    [address] [phone]  10/1/2021 - None Entered    [facility] [id number]       Subscriber Name Subscriber Birth Date Member ID       RITESH QUEVEDO 1965 [id number]                    History & Physical      Jean Cooper IV, MD at 03/03/22 0716          Cardiology H & P  [facility] Cardiology      Reason for consultation:  · CODE AMI    Requesting provider: [facility]    IDENTIFICATION: 56 year old from [facility]      Active Hospital Problems    Diagnosis  POA   • **STEMI (ST elevation myocardial infarction) (HCC) [I21.3]  Yes     Priority: High   • Hyperlipidemia LDL goal <70 [E78.5]  Unknown              56-year-old gentleman with no prior cardiac history presented to " [facility] ER with 24 hours of substernal chest discomfort.  Discomfort was most notable in his throat and upper chest.  He developed nausea and vomiting.  He presented to [facility] ER with inferior ST elevation.  Code AMI was activated.  The patient was flown directly to the cardiac catheterization lab at [facility] with ongoing pain, nausea, vomiting but hemodynamically stable.    Cardiac catheterization was performed via the right radial approach.  He was found to have occlusion of the distal RCA which was treated with multiple overlapping drug-eluting stents from proximal to distal vessel.  Additionally, he had a high-grade stenosis of a large ramus intermediate branch.  This was treated with a drug-eluting stent as well.  LVEF 50% with inferior akinesis.        Not on File    Prior to Admission medications    Not on File       History reviewed. No pertinent past medical history.    History reviewed. No pertinent surgical history.    History reviewed. No pertinent family history.    Social History     Tobacco Use   Smoking Status Not on file   Smokeless Tobacco Not on file       Social History     Substance and Sexual Activity   Alcohol Use None         Review of Systems:   Review of Systems   Unable to perform ROS: acuity of condition   Cardiovascular: Positive for chest pain.   Gastrointestinal: Positive for nausea and vomiting.            Vital Sign Min/Max for last 24 hours  No data recorded   BP  Min: 140/86  Max: 140/86   Pulse  Min: 75  Max: 75   Resp  Min: 20  Max: 20   SpO2  Min: 98 %  Max: 98 %   Flow (L/min)  Min: 3  Max: 3    No intake or output data in the 24 hours ending 03/03/22 0958          Constitutional:       Appearance: Healthy appearance. Obese.   Eyes:      General: Lids are normal. No scleral icterus.     Conjunctiva/sclera: Conjunctivae normal.   HENT:      Head: Normocephalic and atraumatic.   Neck:      Thyroid: No thyromegaly.      Vascular: No carotid bruit or JVD.   Pulmonary:       Effort: Pulmonary effort is normal.      Breath sounds: Normal breath sounds. No wheezing. No rhonchi. No rales.   Cardiovascular:      Normal rate. Regular rhythm.      Murmurs: There is no murmur.      No gallop. No rub.   Pulses:     Intact distal pulses.   Edema:     Peripheral edema absent.   Abdominal:      General: There is no distension.      Palpations: Abdomen is soft. There is no abdominal mass.   Musculoskeletal:      Cervical back: Normal range of motion. Skin:     General: Skin is warm and dry.      Findings: No rash.   Neurological:      General: No focal deficit present.      Mental Status: Alert and oriented to person, place, and time.      Gait: Gait is intact.   Psychiatric:         Attention and Perception: Attention normal.         Mood and Affect: Mood normal.         Behavior: Behavior normal.          Tele: Sinus    DATA REVIEW:    EKG (3/3/2022): Sinus with inferior ST elevation                               Inferior STEMI  · Status post PCI of RCA as well as ramus intermediate branch  · LVEF 50%    Hyperlipidemia goal LDL <70  · We will start atorvastatin 80 mg nightly                 · Admit to ICU  · Metoprolol 25 mg twice daily  · Valsartan 40 mg daily  · Low-dose aspirin and ticagrelor until 3/2023      Electronically signed by Jean SMITH. Kenneth ARELLANO MD, 03/03/22, 7:16 AM EST.        Electronically signed by Jean Cooper IV, MD at 03/03/22 1001       Emergency Department Notes    No notes of this type exist for this encounter.           Current Facility-Administered Medications   Medication Dose Route Frequency Provider Last Rate Last Admin   • acetaminophen (TYLENOL) tablet 650 mg  650 mg Oral Q4H PRN Jean Cooper IV, MD   650 mg at 03/03/22 1224   • aspirin EC tablet 81 mg  81 mg Oral Daily Jean Cooper IV, MD   81 mg at 03/04/22 0837   • atorvastatin (LIPITOR) tablet 80 mg  80 mg Oral Nightly Jean Cooper IV, MD   80 mg at  03/03/22 2127   • sennosides-docusate (PERICOLACE) 8.6-50 MG per tablet 2 tablet  2 tablet Oral BID Jean Cooper IV, MD   2 tablet at 03/04/22 0837    And   • polyethylene glycol (MIRALAX) packet 17 g  17 g Oral Daily PRN Jean Cooper IV, MD        And   • bisacodyl (DULCOLAX) EC tablet 5 mg  5 mg Oral Daily PRN Jean Cooper IV, MD        And   • bisacodyl (DULCOLAX) suppository 10 mg  10 mg Rectal Daily PRN Jean Cooper IV, MD       • HYDROcodone-acetaminophen (NORCO) 7.5-325 MG per tablet 1 tablet  1 tablet Oral Q4H PRN Jean Cooper IV, MD       • Magnesium Sulfate 2 gram Bolus, followed by 8 gram infusion (total Mg dose 10 grams)- Mg less than or equal to 1mg/dL  2 g Intravenous PRN Jean Cooper IV, MD        Or   • Magnesium Sulfate 2 gram / 50mL Infusion (GIVE X 3 BAGS TO EQUAL 6GM TOTAL DOSE) - Mg 1.1 - 1.5 mg/dl  2 g Intravenous PRJean Barbosa IV, MD        Or   • Magnesium Sulfate 4 gram infusion- Mg 1.6-1.9 mg/dL  4 g Intravenous PRJean Barbosa IV, MD 25 mL/hr at 03/03/22 1446 4 g at 03/03/22 1446   • metoprolol tartrate (LOPRESSOR) tablet 25 mg  25 mg Oral Q12H Jean Cooper IV, MD   25 mg at 03/04/22 0955   • morphine injection 2 mg  2 mg Intravenous Q4H PRN Jean Cooper IV, MD   2 mg at 03/03/22 0832   • ondansetron (ZOFRAN) tablet 4 mg  4 mg Oral Q6H PRN Jean Cooper IV, MD   4 mg at 03/03/22 1236    Or   • ondansetron (ZOFRAN) injection 4 mg  4 mg Intravenous Q6H PRN Jean Cooper IV, MD       • Pharmacy Consult - MTM   Does not apply Daily Samantha Cowan, PharmD       • Pharmacy Meds to Bed Consult   Does not apply Daily Jean Cooper IV, MD       • potassium chloride (KLOR-CON) packet 40 mEq  40 mEq Oral PRN Jean Cooper IV, MD       • potassium chloride (MICRO-K) CR capsule 40 mEq  40 mEq Oral PRN Jean Cooper  SARAH ARELLANO MD       • sodium chloride 0.9 % flush 10 mL  10 mL Intravenous Q12H Jean Cooper IV, MD   10 mL at 03/03/22 1221   • sodium chloride 0.9 % flush 10 mL  10 mL Intravenous PRN Jean Cooper IV, MD       • ticagrelor (BRILINTA) tablet 90 mg  90 mg Oral BID Jean Cooper IV, MD   90 mg at 03/04/22 0837   • valsartan (DIOVAN) tablet 40 mg  40 mg Oral Daily Jean Cooper IV, MD   40 mg at 03/04/22 0839     Lab Results (last 72 hours)     Procedure Component Value Units Date/Time    Magnesium [[id number]]  (Normal) Collected: 03/04/22 0425    Specimen: Blood Updated: 03/04/22 0557     Magnesium 2.4 mg/dL      Comment: Result checked       Basic Metabolic Panel [[id number]]  (Abnormal) Collected: 03/04/22 0425    Specimen: Blood Updated: 03/04/22 0555     Glucose 109 mg/dL      BUN 10 mg/dL      Creatinine 0.96 mg/dL      Sodium 135 mmol/L      Potassium 4.2 mmol/L      Comment: Slight hemolysis detected by analyzer. Results may be affected.        Chloride 98 mmol/L      CO2 26.0 mmol/L      Calcium 9.3 mg/dL      BUN/Creatinine Ratio 10.4     Anion Gap 11.0 mmol/L      eGFR 91.6 mL/min/1.73      Comment: National Kidney Foundation and American Society of Nephrology (ASN) Task Force recommended calculation based on the Chronic Kidney Disease Epidemiology Collaboration (CKD-EPI) equation refit without adjustment for race.       Narrative:      GFR Normal >60  Chronic Kidney Disease <60  Kidney Failure <15      TSH [[id number]]  (Normal) Collected: 03/04/22 0425    Specimen: Blood Updated: 03/04/22 0522     TSH 0.767 uIU/mL     CBC & Differential [[id number]]  (Abnormal) Collected: 03/03/22 1442    Specimen: Blood Updated: 03/03/22 1939    Narrative:      The following orders were created for panel order CBC & Differential.  Procedure                               Abnormality         Status                     ---------                               -----------          ------                     CBC Auto Differential[[id number]]        Abnormal            Final result                 Please view results for these tests on the individual orders.    CBC Auto Differential [[id number]]  (Abnormal) Collected: 03/03/22 1442    Specimen: Blood Updated: 03/03/22 1939     WBC 16.41 10*3/mm3      RBC 4.78 10*6/mm3      Hemoglobin 14.3 g/dL      Hematocrit 41.1 %      MCV 86.0 fL      MCH 29.9 pg      MCHC 34.8 g/dL      RDW 14.1 %      RDW-SD 44.4 fl      MPV 11.2 fL      Platelets 244 10*3/mm3      Neutrophil % 80.9 %      Lymphocyte % 11.8 %      Monocyte % 6.6 %      Eosinophil % 0.1 %      Basophil % 0.2 %      Immature Grans % 0.4 %      Neutrophils, Absolute 13.29 10*3/mm3      Lymphocytes, Absolute 1.93 10*3/mm3      Monocytes, Absolute 1.08 10*3/mm3      Eosinophils, Absolute 0.01 10*3/mm3      Basophils, Absolute 0.03 10*3/mm3      Immature Grans, Absolute 0.07 10*3/mm3      nRBC 0.0 /100 WBC     Hemoglobin A1c [[id number]]  (Abnormal) Collected: 03/03/22 1442    Specimen: Blood Updated: 03/03/22 1851     Hemoglobin A1C 5.70 %     Narrative:      Hemoglobin A1C Ranges:    Increased Risk for Diabetes  5.7% to 6.4%  Diabetes                     >= 6.5%  Diabetic Goal                < 7.0%    POC Activated Clotting Time [[id number]]  (Abnormal) Collected: 03/03/22 0927    Specimen: Blood Updated: 03/03/22 1715     Activated Clotting Time  249 Seconds      Comment: Serial Number: [id number]:  [id number]       POC Activated Clotting Time [[id number]]  (Abnormal) Collected: 03/03/22 0842    Specimen: Blood Updated: 03/03/22 1715     Activated Clotting Time  356 Seconds      Comment: Serial Number: [id number]:  [id number]       Lipid Panel [[id number]]  (Abnormal) Collected: 03/03/22 1205    Specimen: Blood Updated: 03/03/22 1430     Total Cholesterol 259 mg/dL      Triglycerides 114 mg/dL      HDL Cholesterol 43 mg/dL      LDL Cholesterol  195 mg/dL      VLDL Cholesterol 21 mg/dL      LDL/HDL  Ratio 4.49    Narrative:      Cholesterol Reference Ranges  (U.S. Department of Health and Human Services ATP III Classifications)    Desirable          <200 mg/dL  Borderline High    200-239 mg/dL  High Risk          >240 mg/dL      Triglyceride Reference Ranges  (U.S. Department of Health and Human Services ATP III Classifications)    Normal           <150 mg/dL  Borderline High  150-199 mg/dL  High             200-499 mg/dL  Very High        >500 mg/dL    HDL Reference Ranges  (U.S. Department of Health and Human Services ATP III Classifications)    Low     <40 mg/dl (major risk factor for CHD)  High    >60 mg/dl ('negative' risk factor for CHD)        LDL Reference Ranges  (U.S. Department of Health and Human Services ATP III Classifications)    Optimal          <100 mg/dL  Near Optimal     100-129 mg/dL  Borderline High  130-159 mg/dL  High             160-189 mg/dL  Very High        >189 mg/dL    POC Creatinine [[id number]]  (Normal) Collected: 03/03/22 0843    Specimen: Blood Updated: 03/03/22 1358     Creatinine 0.60 mg/dL      Comment: Serial Number: [id number]:  [id number]       Magnesium [[id number]]  (Normal) Collected: 03/03/22 1205    Specimen: Blood Updated: 03/03/22 1304     Magnesium 1.8 mg/dL     Troponin [[id number]]  (Abnormal) Collected: 03/03/22 1205    Specimen: Blood Updated: 03/03/22 1255     Troponin T 6.120 ng/mL     Narrative:      Troponin T Reference Range:  <= 0.03 ng/mL-   Negative for AMI  >0.03 ng/mL-     Abnormal for myocardial necrosis.  Clinicians would have to utilize clinical acumen, EKG, Troponin and serial changes to determine if it is an Acute Myocardial Infarction or myocardial injury due to an underlying chronic condition.       Results may be falsely decreased if patient taking Biotin.      COVID PRE-OP / PRE-PROCEDURE SCREENING ORDER (NO ISOLATION) - Swab, Nasopharynx [[id number]]  (Normal) Collected: 03/03/22 0857    Specimen: Swab from Nasopharynx Updated: 03/03/22 0917     Narrative:      The following orders were created for panel order COVID PRE-OP / PRE-PROCEDURE SCREENING ORDER (NO ISOLATION) - Swab, Nasopharynx.  Procedure                               Abnormality         Status                     ---------                               -----------         ------                     COVID-19, ABBOTT IN-HOUS...[[id number]]  Normal              Final result                 Please view results for these tests on the individual orders.    COVID-19, ABBOTT IN-HOUSE,NASAL Swab (NO TRANSPORT MEDIA) 2 HR TAT - Swab, Nasopharynx [[id number]]  (Normal) Collected: 03/03/22 0857    Specimen: Swab from Nasopharynx Updated: 03/03/22 0917     COVID19 Presumptive Negative    Narrative:      Fact sheet for providers: https://www.fda.gov/media/620666/download     Fact sheet for patients: https://www.fda.gov/media/221318/download    Test performed by PCR.  If inconsistent with clinical signs and symptoms patient should be tested with different authorized molecular test.          Imaging Results (Last 72 Hours)     ** No results found for the last 72 hours. **        Operative/Procedure Notes (last 72 hours)  Notes from 03/01/22 1437 through 03/04/22 1437   No notes of this type exist for this encounter.            Physician Progress Notes (last 7 days)      Dorothy Moody PA at 03/04/22 0843     Attestation signed by Jean Cooper IV, MD at 03/04/22 1127    I have reviewed this documentation and agree.    H. C. Ignacio Cooper MD [facility], [facility]  Interventional and General Cardiology                       [facility] - Progress Note    Ritesh Sae  1965  N232/1    03/04/22, 08:43 EST      Chief Complaint: Following for CAD.  S/P Inferior MI with PCI of RCA    Subjective:   Patient remains in ICU, stable overnight without symptoms.  Sitting up in bed - has not been up ambulating yet.  Denies chest pain, denies dyspnea.  Not craving cigarettes at this  "time.  Asking about work release/forms    Review of Systems:  Pertinent positives are listed above and in physical exam.  All others have been reviewed and are negative.    aspirin, 81 mg, Oral, Daily  atorvastatin, 80 mg, Oral, Nightly  metoprolol tartrate, 25 mg, Oral, Q12H  pharmacy consult - MTM, , Does not apply, Daily  Pharmacy Meds to Bed Consult, , Does not apply, Daily  senna-docusate sodium, 2 tablet, Oral, BID  sodium chloride, 10 mL, Intravenous, Q12H  ticagrelor, 90 mg, Oral, BID  valsartan, 40 mg, Oral, Daily        Objective:  Vitals:   height is 187.2 cm (73.7\") and weight is 101 kg (222 lb 10.6 oz). His oral temperature is 98.3 °F (36.8 °C). His blood pressure is 114/70 and his pulse is 82. His respiration is 16 and oxygen saturation is 91%.       Intake/Output Summary (Last 24 hours) at 3/4/2022 0843  Last data filed at 3/4/2022 0500  Gross per 24 hour   Intake 840 ml   Output 1400 ml   Net -560 ml       Physical Exam:  General:  WN, NAD, A and O x3.  CV:  Normal S1,S2. No murmur, rub, or gallop.  Resp:  CTA Chris, equal, nonlabored.  Abd:  Soft, + BS, no organomegaly. Nontender to palpation.  Extrem:  No edema BLE, 2+ pedal/PT pulses.   Right wrist stable without erythema/bruising.  Dressing personally removed.         Results from last 7 days   Lab Units 03/03/22  1442   WBC 10*3/mm3 16.41*   HEMOGLOBIN g/dL 14.3   HEMATOCRIT % 41.1   PLATELETS 10*3/mm3 244     Results from last 7 days   Lab Units 03/04/22  0425   SODIUM mmol/L 135*   POTASSIUM mmol/L 4.2   CHLORIDE mmol/L 98   CO2 mmol/L 26.0   BUN mg/dL 10   CREATININE mg/dL 0.96   CALCIUM mg/dL 9.3   GLUCOSE mg/dL 109*         Results from last 7 days   Lab Units 03/04/22  0425   TSH uIU/mL 0.767     Results from last 7 days   Lab Units 03/03/22  1205   CHOLESTEROL mg/dL 259*   TRIGLYCERIDES mg/dL 114   HDL CHOL mg/dL 43   LDL CHOL mg/dL 195*           Tele:  NSR    Assessment and Plan:  1.  Inferior MI   -  57 yo CM presents with chest pain, " inferior MI by EKG.  S/P successful PCI of distal RCA and RI. EF 50%.   -  Continue DAPT with low dose ASA, Brilinta for a year.  Continue high intensity statin, beta blocker, ARB.   -  Will keep another 24 hours and hopefully home tomorrow.  Per patient request, he would like to follow up with one of our physicians in [facility]  - will arrange 1 month follow up with EKG, Dr. Soto.   -  Cardiac Rehab to consult with patient today.  Ambulate.   -  Gave pt our office number/RN info to call with work related paperwork.      2.  Hyperlipidemia   -  Lipid panel personally reviewed.   -  Continue high intensity statin and appropriate diet.    3. Tobacco Abuse   -  Cessation education to be provided.   -  At risk for readmission      I discussed the patient's findings and my recommendations with the patient, any present family members, and the nursing staff.       IRAM Goldsmith-MERLIN  03/04/22, 08:43 EST  Electronically signed by IRAM Goldsmith, 03/04/22, 9:18 AM EST.                      Electronically signed by Jean Cooper IV, MD at 03/04/22 1127     Zafar Dukes MD at 03/03/22 1028          LIZBETH Samson NOTE    Ritesh Quevedo is a 56 y.o. male who began having substernal chest pain on 3/2/22 which he contributed to gas pain and did not seek care at that time. He woke up on 3/3/22 with crushing and burning chest pain that was worse, rated 7/10 and associated with nausea and vomiting. He presented to the ED at [facility] where EKG showed inferior wall MI.  Code AMI was activated and the patient was flown to [facility] and taken emergently to the cath lab where he underwent PCI with overlapping KYUNG to the distal RCA, mid RCA, proximal RCA, and proximal ramus. LVEF was 50% with inferior akinesis. He was started on a Heparin gtt and given a dose of Brilinta.     He denied dizziness, fatigue, SOA, or changes in functional abilities.  He smokes 1/2  pack a day since he was 14 years old and admits to drinking 1 shot of whiskey every weekend. His father and maternal grandparents had cardiac disease.     On arrival to ICU he is alert, oriented and denies chest pain, nausea, or shortness of air. He has a TR band in place on the right wrist. Right hand and fingers appear dusky with no palpable radial pulse and cap refill is prolonged at > 5 seconds. TR band decreased by 2 mL of air with pulse regained and hand color/cap refill improving but site began to bleed. 1 mL of air replaced with cessation of bleeding and pulse remains palpable with adequate cap refill.      []   Information obtained by review of electronic health records.(non-face-to-face)  [x]   Information obtained by face-to-face contact with the patient.      INTENSIVIST   HOSPITAL VISIT NOTE     Hospital:  LOS: 0 days     Subjective   S     Ritesh, 56 y.o. male is followed for:No chief complaint on file.       STEMI (ST elevation myocardial infarction) (HCC)    Hyperlipidemia LDL goal <70    Tobacco abuse    As an Intensivist, we provide an integrated approach to the ICU patient and family, medical management of comorbid conditions, including but not limited to electrolytes, glycemic control, organ dysfunction, lead interdisciplinary rounds and coordinate the care with all other services, including those from other specialists.     HPI:  POD: Day of Surgery     Ritesh is a 56 y.o. male, who was transferred to this Hospital from [facility]  on 3/3/2022 because of AMI.     His initial evaluation at the ED suggested and inferior wall MI.    He started having Chest pain this morning.    He is a smoker.    For further details see note above.    I saw him in 2B ICU after his LHC. He is doing much better, still some soreness on his chest. No respiratory distress.    The patient has started, but not completed, their COVID-19 vaccination series.        PMH: He  has a past medical history of  Hypertension.   PSxH: He  has a past surgical history that includes Knee surgery.     Medications:  No current facility-administered medications on file prior to encounter.     Current Outpatient Medications on File Prior to Encounter   Medication Sig   • lisinopril (PRINIVIL,ZESTRIL) 10 MG tablet Take 10 mg by mouth Daily.        Allergies: He has No Known Allergies.   FH: His family history includes Heart disease in his father, paternal grandfather, and paternal grandmother.   SH: He  reports that he has been smoking cigarettes. He has a 21.00 pack-year smoking history. He has never used smokeless tobacco. He reports current alcohol use of about 1.0 standard drink of alcohol per week. He reports that he does not use drugs.     The patient's relevant past medical, surgical and social history were reviewed and updated in Epic as appropriate.        History     Last Reviewed by Zafar Dukes MD on 3/3/2022 at  4:59 PM    Sections Reviewed    Medical, Family, Surgical, Tobacco, Alcohol, Drug Use, Sexual Activity,   Social Documentation      Problem list reviewed by Zafar Dukes MD on 3/3/2022 at  4:59 PM  Medicines reviewed by Zafar Dukes MD on 3/3/2022 at  4:59 PM  Allergies reviewed by Zafar Dukes MD on 3/3/2022 at  4:59 PM       Review of Systems  As described in the HPI. All other systems reviewed and are negative.    Objective   O     Vitals:  Temp: 98.4 °F (36.9 °C) (03/03/22 1615) Temp  Min: 98 °F (36.7 °C)  Max: 98.4 °F (36.9 °C)   Temp core:      BP: 114/77 (03/03/22 1615) BP  Min: 90/63  Max: 156/88   Pulse: 76 (03/03/22 1615) Pulse  Min: 50  Max: 85   Resp: 12 (03/03/22 1615) Resp  Min: 12  Max: 20   SpO2: 95 % (03/03/22 1615) SpO2  Min: 89 %  Max: 98 %   Device: nasal cannula (03/03/22 1615)    Flow Rate: 2 (03/03/22 1615) Flow (L/min)  Min: 2  Max: 3     Intake/Ouptut 24 hrs (7:00AM - 6:59 AM)    Intake/Output Summary (Last 24 hours) at 3/3/2022 1659  Last data filed at 3/3/2022 1116  Gross  per 24 hour   Intake --   Output 200 ml   Net -200 ml        Physical Examination    Telemetry:  Rhythm: sinus bradycardia (03/03/22 1600)         Constitutional:  No acute distress.   Head: Normocephalic.   ENMT: No oral lesions.   Neck: No adenopathy. Supple.  Trachea midline.   Cardiovascular: RRR.   Normal heart sounds.  No murmurs, gallop or rub.   Respiratory: Normal breath sounds  No adventitious sounds.   Abdominal:  Soft with no tenderness.  No distension.   No HSM.   Extremities: Warm.  Dry.  No cyanosis.  No Edema   Neurological/Psych:   Alert, Oriented, Cooperative.  Best Eye Response: 4-->(E4) spontaneous (03/03/22 1600)  Best Motor Response: 6-->(M6) obeys commands (03/03/22 1600)  Best Verbal Response: 5-->(V5) oriented (03/03/22 1600)  Josey Coma Scale Score: 15 (03/03/22 1600)     Results Reviewed:  Laboratory  Microbiology  Radiology  Pathology    Hematology:        Chemistry:  Estimated Creatinine Clearance: 169.5 mL/min (by C-G formula based on SCr of 0.6 mg/dL).  Results from last 7 days   Lab Units 03/03/22  0843   CREATININE mg/dL 0.60     Results from last 7 days   Lab Units 03/03/22  1205   MAGNESIUM mg/dL 1.8     Cardiac Labs:  Results from last 7 days   Lab Units 03/03/22  1205   TROPONIN T ng/mL 6.120*     COVID-19  Lab Results   Lab Value Date/Time    COVID19 Presumptive Negative 03/03/2022 0857     Images:  No radiology results for the last day    Echo:    Results: Reviewed.  I reviewed the patient's new laboratory and imaging results.  I independently reviewed the patient's new images.    Medications: Reviewed.    Assessment/Plan   A / P     Ritesh is a 56 y.o. male admitted on 3/3/2022 with ST elevation myocardial infarction involving right coronary artery (HCC) [I21.11]:     1. STEMI  1. [facility] 03/03/22: Severe 2-vessel CAD (RCA, ramus).    1. Successful PCI of proximal to distal RCA and RPL using overlapping Xience KYUNG.    2. Successful PCI of ramus branch with Xience 3.5 x 23 mm  KYUNG  2. HTN on ACEI  3. Dyslipidemia on statins    Lab Results   Lab Value Date/Time    CHOL 259 (H) 03/03/2022 1205    HDL 43 03/03/2022 1205     (H) 03/03/2022 1205    TRIG 114 03/03/2022 1205      4. Tobacco use  5. No history of DM        No results found for: HGBA1C    Advance Directives: Code Status and Medical Interventions:   Ordered at: 03/03/22 1008     Code Status (Patient has no pulse and is not breathing):    CPR (Attempt to Resuscitate)     Medical Interventions (Patient has pulse or is breathing):    Full Support        Plan:    1. ICU admission after [facility] [place].  2. Monitor arrhythmias.  3. Management of Electrolytes Disorders  4. Tobacco Cessation  5. A1c and TSH in AM  6. Disposition: Keep in ICU.   1. Labs in AM    Plan of care and goals reviewed during interdisciplinary rounds.  I discussed the patient's findings and my recommendations with patient    Level of Risk is High due to:  illness with threat to life or bodily function.     Time: 50 minutes, in direct patient care, with the patient and/or on the loomis coordinating care with other health care providers.     I have spent > 50% percent of this time, counseling and discussing management.     []  Primary Attending  [x]  Consultant      Electronically signed by Zafar Dukes MD at 03/03/22 5153

## 2022-03-04 NOTE — PROGRESS NOTES
INTENSIVIST   HOSPITAL VISIT NOTE     Hospital:  LOS: 1 day        XAVIER Awad, 56 y.o. male is followed for:No chief complaint on file.       STEMI (ST elevation myocardial infarction) (HCC)    Hyperlipidemia LDL goal <70    Tobacco abuse    As an Intensivist, we provide an integrated approach to the ICU patient and family, medical management of comorbid conditions, including but not limited to electrolytes, glycemic control, organ dysfunction, lead interdisciplinary rounds and coordinate the care with all other services, including those from other specialists.     Interval History:  POD: 1 Day Post-Op (C)    Doing well.  No chest pain.  He wants to go home.    The patient has started, but not completed, their COVID-19 vaccination series.        The patient's relevant past medical, surgical and social history were reviewed and updated in Epic as appropriate.      History     Last Reviewed by Zafar Dukes MD on 3/3/2022 at  4:59 PM    Sections Reviewed    Medical, Family, Surgical, Tobacco, Alcohol, Drug Use, Sexual Activity,   Social Documentation      Problem list reviewed by Zafar Dukes MD on 3/3/2022 at  4:59 PM  Medicines reviewed by Zafar Dukes MD on 3/3/2022 at  4:59 PM  Allergies reviewed by Zafar Dukes MD on 3/3/2022 at  4:59 PM      O     Vitals:  Temp: 98.6 °F (37 °C) (03/04/22 1442) Temp  Min: 98.1 °F (36.7 °C)  Max: 98.6 °F (37 °C)   Temp core:      BP: 124/80 (03/04/22 1442) BP  Min: 95/73  Max: 134/89   Pulse: 86 (03/04/22 1500) Pulse  Min: 57  Max: 91   Resp: 16 (03/04/22 1442) Resp  Min: 12  Max: 18   SpO2: 94 % (03/04/22 1500) SpO2  Min: 90 %  Max: 96 %   Device: room air (03/04/22 1445)    Flow Rate: 2 (03/03/22 1615) Flow (L/min)  Min: 2  Max: 2     Intake/Ouptut 24 hrs (7:00AM - 6:59 AM)    Intake/Output Summary (Last 24 hours) at 3/4/2022 1541  Last data filed at 3/4/2022 1300  Gross per 24 hour   Intake 840 ml   Output 2550 ml   Net -1710 ml        Physical  Examination    Telemetry:  Rhythm: normal sinus rhythm (03/04/22 1445)         Constitutional:  No acute distress.   Head: Normocephalic.   ENMT: No oral lesions.   Neck: No adenopathy. Supple.  Trachea midline.   Cardiovascular: RRR.   Normal heart sounds.  No murmurs, gallop or rub.   Respiratory: Normal breath sounds  No adventitious sounds.   Abdominal:  Soft with no tenderness.  No distension.   No HSM.   Extremities: Warm.  Dry.  No cyanosis.  No Edema   Neurological/Psych:   Alert, Oriented, Cooperative.  Best Eye Response: 4-->(E4) spontaneous (03/04/22 1445)  Best Motor Response: 6-->(M6) obeys commands (03/04/22 1445)  Best Verbal Response: 5-->(V5) oriented (03/04/22 1445)  Franklin Coma Scale Score: 15 (03/04/22 1445)     Results Reviewed:  Laboratory  Microbiology  Radiology  Pathology    Hematology:  Results from last 7 days   Lab Units 03/03/22  1442   WBC 10*3/mm3 16.41*   HEMOGLOBIN g/dL 14.3   PLATELETS 10*3/mm3 244   NEUTROS ABS 10*3/mm3 13.29*   LYMPHS ABS 10*3/mm3 1.93       Chemistry:  Estimated Creatinine Clearance: 105.9 mL/min (by C-G formula based on SCr of 0.96 mg/dL).  Results from last 7 days   Lab Units 03/04/22  0425 03/03/22  0843   SODIUM mmol/L 135*  --    POTASSIUM mmol/L 4.2  --    CHLORIDE mmol/L 98  --    CO2 mmol/L 26.0  --    BUN mg/dL 10  --    CREATININE mg/dL 0.96 0.60   GLUCOSE mg/dL 109*  --      Results from last 7 days   Lab Units 03/04/22  0425 03/03/22  1205   CALCIUM mg/dL 9.3  --    MAGNESIUM mg/dL 2.4 1.8     Cardiac Labs:  Results from last 7 days   Lab Units 03/03/22  1205   TROPONIN T ng/mL 6.120*     COVID-19  Lab Results   Lab Value Date/Time    COVID19 Presumptive Negative 03/03/2022 0857     Images:  No radiology results for the last day    Echo:  Results for orders placed during the hospital encounter of 03/03/22    Adult Transthoracic Echo Complete W/ Cont if Necessary Per Protocol    Interpretation Summary  · Left ventricular systolic function is normal.  Estimated left ventricular EF = 60%.  · The following left ventricular wall segments are hypokinetic: mid inferior and basal inferior.  · The cardiac valves are anatomically and functionally normal.    Results: Reviewed.  I reviewed the patient's new laboratory and imaging results.  I independently reviewed the patient's new images.    Medications: Reviewed.    Assessment/Plan   A / P     Ritesh is a 56 y.o. male admitted on 3/3/2022 with ST elevation myocardial infarction involving right coronary artery (HCC) [I21.11]:     STEMI  [facility] 22: Severe 2-vessel CAD (RCA, ramus).    Successful PCI of proximal to distal RCA and RPL using overlapping Xience KYUNG.    Successful PCI of ramus branch with Xience 3.5 x 23 mm KYUNG  HTN on ACEI  Dyslipidemia on statins    Lab Results   Lab Value Date/Time    CHOL 259 (H) 2022 1205    HDL 43 2022 1205     (H) 2022 1205    TRIG 114 2022 1205      Tobacco use  At risk for DM (pre-diabetes) based on his HbA1c. [HbA1C 5.7%-6.4%, eA-139 mg/dL].         Lab Results   Lab Value Date/Time    HGBA1C 5.70 (H) 2022 1442       Advance Directives: Code Status and Medical Interventions:   Ordered at: 22 1008     Code Status (Patient has no pulse and is not breathing):    CPR (Attempt to Resuscitate)     Medical Interventions (Patient has pulse or is breathing):    Full Support        Plan:    Tobacco Cessation  Disposition: Transfer to Telemetry Unit } Home tomorrow as per Cardiology.    Plan of care and goals reviewed during interdisciplinary rounds.  I discussed the patient's findings and my recommendations with patient    Level of Risk is High due to:  illness with threat to life or bodily function.     We'll follow prn once on the floor. Please call us with question or if his condition changes.    Time: 15 minutes, in direct patient care, with the patient and/or in the ICU or loomis coordinating care with other health care providers.     I  have spent > 50% percent of this time, counseling and discussing management.     []  Primary Attending  [x]  Consultant

## 2022-03-04 NOTE — NURSING NOTE
Pt. Referred for Phase II Cardiac Rehab. Staff discussed benefits of exercise, program protocol, and educational material provided. Teach back verified.  Permission granted from patient for staff to send referral information to outlying program at this time.  Staff sent electronic referral info to [facility].

## 2022-03-05 VITALS
SYSTOLIC BLOOD PRESSURE: 113 MMHG | HEART RATE: 63 BPM | WEIGHT: 222.66 LBS | OXYGEN SATURATION: 94 % | DIASTOLIC BLOOD PRESSURE: 63 MMHG | BODY MASS INDEX: 28.58 KG/M2 | TEMPERATURE: 98.1 F | HEIGHT: 74 IN | RESPIRATION RATE: 18 BRPM

## 2022-03-05 LAB
ANION GAP SERPL CALCULATED.3IONS-SCNC: 12 MMOL/L (ref 5–15)
BASOPHILS # BLD AUTO: 0.06 10*3/MM3 (ref 0–0.2)
BASOPHILS NFR BLD AUTO: 0.4 % (ref 0–1.5)
BUN SERPL-MCNC: 13 MG/DL (ref 6–20)
BUN/CREAT SERPL: 11.5 (ref 7–25)
CALCIUM SPEC-SCNC: 8.9 MG/DL (ref 8.6–10.5)
CHLORIDE SERPL-SCNC: 102 MMOL/L (ref 98–107)
CO2 SERPL-SCNC: 24 MMOL/L (ref 22–29)
CREAT SERPL-MCNC: 1.13 MG/DL (ref 0.76–1.27)
DEPRECATED RDW RBC AUTO: 47.9 FL (ref 37–54)
EGFRCR SERPLBLD CKD-EPI 2021: 75.3 ML/MIN/1.73
EOSINOPHIL # BLD AUTO: 0.16 10*3/MM3 (ref 0–0.4)
EOSINOPHIL NFR BLD AUTO: 1.1 % (ref 0.3–6.2)
ERYTHROCYTE [DISTWIDTH] IN BLOOD BY AUTOMATED COUNT: 14.3 % (ref 12.3–15.4)
GLUCOSE SERPL-MCNC: 105 MG/DL (ref 65–99)
HCT VFR BLD AUTO: 42.2 % (ref 37.5–51)
HGB BLD-MCNC: 14.1 G/DL (ref 13–17.7)
IMM GRANULOCYTES # BLD AUTO: 0.08 10*3/MM3 (ref 0–0.05)
IMM GRANULOCYTES NFR BLD AUTO: 0.6 % (ref 0–0.5)
LYMPHOCYTES # BLD AUTO: 3.42 10*3/MM3 (ref 0.7–3.1)
LYMPHOCYTES NFR BLD AUTO: 23.7 % (ref 19.6–45.3)
MAGNESIUM SERPL-MCNC: 2.2 MG/DL (ref 1.6–2.6)
MCH RBC QN AUTO: 30.5 PG (ref 26.6–33)
MCHC RBC AUTO-ENTMCNC: 33.4 G/DL (ref 31.5–35.7)
MCV RBC AUTO: 91.1 FL (ref 79–97)
MONOCYTES # BLD AUTO: 1.78 10*3/MM3 (ref 0.1–0.9)
MONOCYTES NFR BLD AUTO: 12.3 % (ref 5–12)
NEUTROPHILS NFR BLD AUTO: 61.9 % (ref 42.7–76)
NEUTROPHILS NFR BLD AUTO: 8.95 10*3/MM3 (ref 1.7–7)
NRBC BLD AUTO-RTO: 0 /100 WBC (ref 0–0.2)
PLATELET # BLD AUTO: 207 10*3/MM3 (ref 140–450)
PMV BLD AUTO: 11 FL (ref 6–12)
POTASSIUM SERPL-SCNC: 3.9 MMOL/L (ref 3.5–5.2)
RBC # BLD AUTO: 4.63 10*6/MM3 (ref 4.14–5.8)
SODIUM SERPL-SCNC: 138 MMOL/L (ref 136–145)
WBC NRBC COR # BLD: 14.45 10*3/MM3 (ref 3.4–10.8)

## 2022-03-05 PROCEDURE — 80048 BASIC METABOLIC PNL TOTAL CA: CPT | Performed by: INTERNAL MEDICINE

## 2022-03-05 PROCEDURE — 83735 ASSAY OF MAGNESIUM: CPT | Performed by: INTERNAL MEDICINE

## 2022-03-05 PROCEDURE — 85025 COMPLETE CBC W/AUTO DIFF WBC: CPT | Performed by: INTERNAL MEDICINE

## 2022-03-05 PROCEDURE — 99238 HOSP IP/OBS DSCHRG MGMT 30/<: CPT | Performed by: NURSE PRACTITIONER

## 2022-03-05 RX ORDER — ASPIRIN 81 MG/1
81 TABLET ORAL DAILY
Qty: 90 TABLET | Refills: 4 | Status: SHIPPED | OUTPATIENT
Start: 2022-03-06 | End: 2023-02-28 | Stop reason: SDUPTHER

## 2022-03-05 RX ORDER — ATORVASTATIN CALCIUM 80 MG/1
80 TABLET, FILM COATED ORAL NIGHTLY
Qty: 90 TABLET | Refills: 3 | Status: SHIPPED | OUTPATIENT
Start: 2022-03-05 | End: 2023-02-28 | Stop reason: SDUPTHER

## 2022-03-05 RX ADMIN — METOPROLOL TARTRATE 25 MG: 25 TABLET, FILM COATED ORAL at 08:59

## 2022-03-05 RX ADMIN — ASPIRIN 81 MG: 81 TABLET, COATED ORAL at 08:59

## 2022-03-05 RX ADMIN — VALSARTAN 40 MG: 80 TABLET, FILM COATED ORAL at 09:00

## 2022-03-05 RX ADMIN — Medication 10 ML: at 09:02

## 2022-03-05 RX ADMIN — TICAGRELOR 90 MG: 90 TABLET ORAL at 08:59

## 2022-03-05 NOTE — PROGRESS NOTES
" [facility] at [facility] - Progress Note    Ritesh Quevedo  1965  [id number]    03/05/22, 08:43 EST      Chief Complaint: Following for CAD.  S/P Inferior MI with PCI of RCA    Subjective:   Patient moved out of ICU to tele yesterday.  Slept much better last night.  Denies chest pain, denies dyspnea.  Ready to go home.  Sister will be coming to get him around Noon.      Review of Systems:  Pertinent positives are listed above and in physical exam.  All others have been reviewed and are negative.    aspirin, 81 mg, Oral, Daily  atorvastatin, 80 mg, Oral, Nightly  metoprolol tartrate, 25 mg, Oral, Q12H  pharmacy consult - MTM, , Does not apply, Daily  Pharmacy Meds to Bed Consult, , Does not apply, Daily  senna-docusate sodium, 2 tablet, Oral, BID  sodium chloride, 10 mL, Intravenous, Q12H  ticagrelor, 90 mg, Oral, BID  valsartan, 40 mg, Oral, Daily        Objective:  Vitals:   height is 187.2 cm (73.7\") and weight is 101 kg (222 lb 10.6 oz). His oral temperature is 98.1 °F (36.7 °C). His blood pressure is 113/63 and his pulse is 63. His respiration is 18 and oxygen saturation is 94%.       Intake/Output Summary (Last 24 hours) at 3/5/2022 0923  Last data filed at 3/4/2022 1400  Gross per 24 hour   Intake --   Output 800 ml   Net -800 ml       Physical Exam:  General:  WN, NAD, A and O x3.  CV:  Normal S1,S2. No murmur, rub, or gallop.  Resp:  CTA Chris, equal, nonlabored.  Abd:  Soft, + BS, no organomegaly. Nontender to palpation.  Extrem:  No edema BLE, 2+ pedal/PT pulses.            Results from last 7 days   Lab Units 03/05/22  0533   WBC 10*3/mm3 14.45*   HEMOGLOBIN g/dL 14.1   HEMATOCRIT % 42.2   PLATELETS 10*3/mm3 207     Results from last 7 days   Lab Units 03/05/22  0533   SODIUM mmol/L 138   POTASSIUM mmol/L 3.9   CHLORIDE mmol/L 102   CO2 mmol/L 24.0   BUN mg/dL 13   CREATININE mg/dL 1.13   CALCIUM mg/dL 8.9   GLUCOSE mg/dL 105*         Results from last 7 days   Lab Units 03/04/22  0425   TSH " uIU/mL 0.767     Results from last 7 days   Lab Units 03/03/22  1205   CHOLESTEROL mg/dL 259*   TRIGLYCERIDES mg/dL 114   HDL CHOL mg/dL 43   LDL CHOL mg/dL 195*           Tele:  NSR    Assessment and Plan:  1.  Inferior MI   -  57 yo CM presents with chest pain, inferior MI by EKG.  S/P successful PCI of distal RCA and RI. EF 50%.   -  Continue DAPT with low dose ASA, Brilinta for a year.  Continue high intensity statin, beta blocker, ARB.   -  Will DC home today in stable condition.  Per patient request, he would like to follow up with one of our physicians in [facility]  - have arranged 1 month follow up with EKG, Dr. Soto.   -  Cardiac Rehab consulted with patient.    -  Gave pt our office number/RN info to call with work related paperwork.      2.  Hyperlipidemia   -  Lipid panel personally reviewed.   -  Continue high intensity statin and appropriate diet.    3. Tobacco Abuse   -  Cessation education to be provided.   -  At risk for readmission      I discussed the patient's findings and my recommendations with the patient, any present family members, and the nursing staff.       Dorothy Moody PA-C  03/05/22, 09:25 EST    Electronically signed by IRAM Goldsmith, 03/05/22, 9:31 AM EST.

## 2022-03-05 NOTE — EXTERNAL PATIENT INSTRUCTIONS
Patient Education   Table of Contents       Acute Coronary Syndrome       Aspirin and Your Heart       Aspirin Tablets       Atorvastatin Tablets       Heart Attack       Heart-Healthy Eating Plan       Metoprolol Tablets       Ticagrelor oral tablet     To view videos and all your education online visit,   [email or web address]   or scan this QR code with your smartphone.                  Acute Coronary Syndrome     Acute coronary syndrome (ACS) is a serious problem in which there is suddenly not enough blood and oxygen reaching the heart. ACS can result in chest pain or a heart attack.   This condition is a medical emergency. If you have any symptoms of this condition, get help right away.   What are the causes?       This condition may be caused by:       A buildup of fat and cholesterol inside the arteries (atherosclerosis). This is the most common cause. The buildup (plaque) can cause blood vessels in the heart (coronary arteries) to become narrow or blocked, which reduces blood flow to the heart. Plaque can also break off and lead to a clot, which can block an artery and cause a heart attack or stroke.       Sudden tightening of the muscles around the coronary arteries (coronary spasm).       Tearing of a coronary artery (spontaneous coronary artery dissection).       Very low blood pressure (hypotension).       An abnormal heartbeat (arrhythmia).       Other medical conditions that cause a decrease of oxygen to the heart, such as anemiaorrespiratory failure.       Using cocaine or methamphetamine.       What increases the risk?    The following factors may make you more likely to develop this condition:       Age. The risk for ACS increases as you get older.       History of chest pain, heart attack, peripheral artery disease, or stroke.       Having taken chemotherapy or immune-suppressing medicines.       Being male.       Family history of chest pain, heart disease, or stroke.       Smoking.        Not exercising enough.       Being overweight.       High cholesterol.       High blood pressure (hypertension).       Diabetes.       Excessive alcohol use.     What are the signs or symptoms?    Common symptoms of this condition include:      Chest pain. The pain may last a long time, or it may stop and come back (recur). It may feel like:       Crushing or squeezing.       Tightness, pressure, fullness, or heaviness.       Arm, neck, jaw, or back pain.       Heartburn or indigestion.       Shortness of breath.       Nausea.       Sudden cold sweats.       Light-headedness.       Dizziness or passing out.       Tiredness (fatigue).     Sometimes there are no symptoms.   How is this diagnosed?    This condition may be diagnosed based on:       Your medical history and symptoms.      Imaging tests, such as:       An electrocardiogram (ECG). This measures the heart's electrical activity.       X-rays.       CT scan.       A coronary angiogram. For this test, dye is injected into the heart arteries and then X-rays are taken.       Myocardial perfusion imaging. This test shows how well blood flows through your heart muscle.       Blood tests. These may be repeated at certain time intervals.       Exercise stress testing.       Echocardiogram. This is a test that uses sound waves to produce detailed images of the heart.     How is this treated?    Treatment for this condition may include:       Oxygen therapy.      Medicines, such as:       Antiplatelet medicines and blood-thinning medicines, such as aspirin. These help prevent blood clots.       Medicine that dissolves any blood clots (fibrinolytic therapy).       Blood pressure medicines.       Nitroglycerin. This helps widen blood vessels to improve blood flow.       Pain medicine.       Cholesterol-lowering medicine.      Surgery, such as:       Coronary angioplasty with stent placement. This involves placing a small piece of metal that looks like mesh or a spring  into a narrow coronary artery. This widens the artery and keeps it open.       Coronary artery bypass surgery. This involves taking a section of a blood vessel from a different part of your body and placing it on the blocked coronary artery to allow blood to flow around the blockage.       Cardiac rehabilitation. This is a program that includes exercise training, education, and counseling to help you recover.     Follow these instructions at home:   Eating and drinking         Eat a heart-healthy diet that includes whole grains, fruits and vegetables, lean proteins, and low-fat or nonfat dairy products.       Limit how much salt (sodium) you eat as told by your health care provider. Follow instructions from your health care provider about any other eating or drinking restrictions, such as limiting foods that are high in fat and processed sugars.       Use healthy cooking methods such as roasting, grilling, broiling, baking, poaching, steaming, or stir-frying.       Work with a dietitian to follow a heart-healthy eating plan.     Medicines         Take over-the-counter and prescription medicines only as told by your health care provider.      Do not  take these medicines unless your health care provider approves:       Vitamin supplements that contain vitamin A or vitamin E.       NSAIDs, such as ibuprofen, naproxen, or celecoxib.       Hormone replacement therapy that contains estrogen.      If you are taking blood thinners:       Talk with your health care provider before you take any medicines that contain aspirin or NSAIDs. These medicines increase your risk for dangerous bleeding.       Take your medicine exactly as told, at the same time every day.       Avoid activities that could cause injury or bruising, and follow instructions about how to prevent falls.       Wear a medical alert bracelet, and carry a card that lists what medicines you take.     Activity         Follow your cardiac rehabilitation program.  Do exercises as told by your physical therapist.       Ask your health care provider what activities and exercises are safe for you. Follow his or her instructions about lifting, driving, or climbing stairs.     Lifestyle        Do not  use any products that contain nicotine or tobacco, such as cigarettes, e-cigarettes, and chewing tobacco. If you need help quitting, ask your health care provider.      Do not  drink alcohol if your health care provider tells you not to drink.      If you drink alcohol:       Limit how much you have to 0?1 drink a day.       Be aware of how much alcohol is in your drink. In the [place] one drink equals one 12 oz bottle of beer (355 mL), one 5 oz glass of wine (148 mL), or one 1? oz glass of hard liquor (44 mL).       Maintain a healthy weight. If you need to lose weight, work with your health care provider to do so safely.     General instructions         Tell all the health care providers who provide care for you about your heart condition, including your dentist. This may affect the medicines or treatment you receive.       Manage any other health conditions you have, such as hypertension or diabetes. These conditions affect your heart.      Pay attention to your mental health. You may be at higher risk for depression.       Find ways to manage stress.       Talk to your health care provider about depression screening and treatment.      Keep your vaccinations up to date.       Get the flu shot (influenza vaccine) every year.       Get the pneumococcal vaccine if you are age 65 or older.       If directed, monitor your blood pressure at home.       Keep all follow-up visits as told by your health care provider. This is important.     Contact a health care provider if you:         Feel overwhelmed or sad.       Have trouble doing your daily activities.     Get help right away if you:        Have pain in your chest, neck, arm, jaw, stomach, or back that recurs, and:       It lasts for  more than a few minutes.       It is not relieved by taking the medicineyour health care provider prescribed.      Have unexplained:       Heavy sweating.       Heartburn or indigestion.       Nausea or vomiting.       Shortness of breath.       Difficulty breathing.       Fatigue.       Nervousness or anxiety.       Weakness.       Diarrhea.       Dark stools or blood in your stool.       Have sudden light-headedness or dizziness.       Have blood pressure that is higher than 180/120.       Faint.       Have thoughts about hurting yourself.     These symptoms may represent a serious problem that is an emergency. Do not wait to see if the symptoms will go away. Get medical help right away. Call your local emergency services (911 in the [place] Do not drive yourself to the hospital.   Summary         Acute coronary syndrome (ACS) is when there is not enough blood and oxygen being supplied to the heart. ACS can result in chest pain or a heart attack.       Acute coronary syndrome is a medical emergency. If you have any symptoms of this condition, get help right away.       Treatment includes medicines and procedures to open the blocked arteries and restore blood flow.     This information is not intended to replace advice given to you by your health care provider. Make sure you discuss any questions you have with your health care provider.     Document Released: 12/18/2006Document Revised: 05/20/2020Document Reviewed: 12/30/2019     [employer] Patient Education ? 2021 [employer]         Aspirin and Your Heart     Aspirin is a medicine that prevents the platelets in your blood from sticking together. Platelets are the cells that your blood uses for clotting. Aspirin can be used to help reduce the risk of blood clots, heart attacks, and other heart-related problems.   What are the risks?    Daily use of aspirin can cause side effects. Some of these include:       Bleeding. Bleeding can be minor or serious. An example of  minor bleeding is bleeding from a cut, and the bleeding does not stop. An example of more serious bleeding is stomach bleeding or, rarely, bleeding into the brain. Your risk of bleeding increases if you are also taking NSAIDs, such as ibuprofen.       Increased bruising.       Upset stomach.       An allergic reaction. People who have growths inside the nose (nasal polyps) have an increased risk of developing an aspirin allergy.     How to use aspirin to care for your heart           Take aspirin only as told by your health care provider. Make sure that you understand how much to take and what form to take. The two forms of aspirin are:       Non-enteric-coated.This type of aspirin does not have a coating and is absorbed quickly. This type of aspirin also comes in a chewable form.       Enteric-coated. This type of aspirin has a coating that releases the medicine very slowly. Enteric-coated aspirin might cause less stomach upset than non-enteric-coated aspirin. This type of aspirin should not be chewed or crushed.      Work with your health care provider to find out whether it is safe and beneficial for you to take aspirin daily. Taking aspirin daily may be helpful if:       You have had a heart attack or chest pain, or you are at risk for a heart attack.      You have a condition in which certain heart vessels are blocked (coronary artery disease), and you have had a procedure to treat it. Examples are:       Open-heart surgery, such as coronary artery bypass surgery (CABG).       Coronary angioplasty,which is done to widen a blood vessel of your heart.       Having a small mesh tube, or stent, placed in your coronary artery.       You have had certain types of stroke or a mini-stroke known as a transient ischemic attack (TIA).       You have a narrowing of the arteries that supply the limbs (peripheral artery disease, or PAD).       You have long-term (chronic) heart rhythm problems, such as atrial fibrillation,  and your health care provider thinks aspirin may help.       You have valve disease or have had surgery on a valve.       You are considered at increased risk of developing coronary artery disease or PAD.       Follow these instructions at home   Medicines         Take over-the-counter and prescription medicines only as told by your health care provider.      If you are taking blood thinners:       Talk with your health care provider before you take any medicines that contain aspirin or NSAIDs, such as ibuprofen. These medicines increase your risk for dangerous bleeding.       Take your medicine exactly as told, at the same time every day.       Avoid activities that could cause injury or bruising, and follow instructions about how to prevent falls.       Wear a medical alert bracelet or carry a card that lists what medicines you take.     General instructions        Do not  drink alcohol if:       Your health care provider tells you not to drink.       You are pregnant, may be pregnant, or are planning to become pregnant.      If you drink alcohol:      Limit how much you use to:       0?1 drink a day for women.       0?2 drinks a day for men.       Be aware of how much alcohol is in your drink. In the [place] one drink equals one 12 oz bottle of beer (355 mL), one 5 oz glass of wine (148 mL), or one 1? oz glass of hard liquor (44 mL).       Keep all follow-up visits as told by your health care provider. This is important.     Where to find more information         The American Heart Association: www.heart.org       Contact a health care provider if you have:         Unusual bleeding or bruising.       Stomach pain or nausea.       Ringing in your ears.       An allergic reaction that causes hives, itchy skin, or swelling of the lips, tongue, or face.     Get help right away if:         You notice that your bowel movements are bloody, or dark red or black in color.       You vomit or cough up blood.       You have  "blood in your urine.       You cough, breathe loudly (wheeze), or feel short of breath.       You have chest pain, especially if the pain spreads to your arms, back, neck, or jaw.       You have a headache with confusion.      You have any symptoms of a stroke. \"BE FAST\"  is an easy way to remember the main warning signs of a stroke:      B - Balance  . Signs are dizziness, sudden trouble walking, or loss of balance.      E - Eyes  . Signs are trouble seeing or a sudden change in vision.      F - Face  . Signs are sudden weakness or numbness of the face, or the face or eyelid drooping on one side.      A - Arms  . Signs are weakness or numbness in an arm. This happens suddenly and usually on one side of the body.      S - Speech  . Signs are sudden trouble speaking, slurred speech, or trouble understanding what people say.      T - Time  . Time to call emergency services. Write down what time symptoms started.      You have other signs of a stroke, such as:       A sudden, severe headache with no known cause.       Nausea or vomiting.       Seizure.     These symptoms may represent a serious problem that is an emergency. Do not wait to see if the symptoms will go away. Get medical help right away. Call your local emergency services (911 in the [place] Do not drive yourself to the hospital.   Summary         Aspirin use can help reduce the risk of blood clots, heart attacks, and other heart-related problems.       Daily use of aspirin can cause side effects.       Take aspirin only as told by your health care provider. Make sure that you understand how much to take and what form to take.       Your health care provider will help you determine whether it is safe and beneficial for you to take aspirin daily.     This information is not intended to replace advice given to you by your health care provider. Make sure you discuss any questions you have with your health care provider.     Document Released: " 11/30/2009Document Revised: 09/21/2020Document Reviewed: 09/21/2020     [employer] Patient Education ? 2021 [employer]         Aspirin Tablets     What is this medicine?   ASPIRIN (AS pir in) is a pain reliever. It is used to treat mild pain and fever. This medicine is also used as directed by a doctor to prevent and to treat heart attacks, to prevent strokes and blood clots, and to treat arthritis or inflammation.   This medicine may be used for other purposes; ask your health care provider or pharmacist if you have questions.   COMMON BRAND NAME(S): Aspir-Low, Aspir-Edwige, Aspirtab, Lorena Advanced Aspirin, Lorena Aspirin, Lorena Aspirin Extra Strength, Lorena Aspirin Plus, Lorena Extra Strength, Lorena Extra Strength Plus, Lorena Genuine Aspirin, Lorena Womens Aspirin, Bufferin, Bufferin Extra Strength, Bufferin Low Dose   What should I tell my health care provider before I take this medicine?   They need to know if you have any of these conditions:         anemia       asthma       bleeding problems       child with chickenpox, the flu, or other viral infection       diabetes       gout       if you frequently drink alcohol containing drinks       kidney disease       liver disease       low level of vitamin K       lupus       smoke tobacco       stomach ulcers or other problems       an unusual or allergic reaction to aspirin, tartrazine dye, other medicines, dyes, or preservatives       pregnant or trying to get pregnant       breast-feeding     How should I use this medicine?   Take this medicine by mouth with a glass of water. Follow the directions on the package or prescription label. You can take this medicine with or without food. If it upsets your stomach, take it with food. Do not take your medicine more often than directed.   Talk to your pediatrician regarding the use of this medicine in children. While this drug may be prescribed for children as young as 12 years of age for selected conditions, precautions  do apply. Children and teenagers should not use this medicine to treat chicken pox or flu symptoms unless directed by a doctor.   Patients over 65 years old may have a stronger reaction and need a smaller dose.   Overdosage: If you think you have taken too much of this medicine contact a poison control center or emergency room at once.   NOTE: This medicine is only for you. Do not share this medicine with others.   What if I miss a dose?   If you are taking this medication on a regular schedule and miss a dose, take it as soon as you can. If it is almost time for your next dose, take only that dose. Do not take double or extra doses.   What may interact with this medicine?   Do not take this medicine with any of the following medications:         cidofovir       ketorolac       probenecid     This medicine may also interact with the following medications:         alcohol       alendronate       bismuth subsalicylate       flavocoxid       herbal supplements like feverfew, garlic, radha, ginkgo biloba, horse chestnut       medicines for diabetes or glaucoma like acetazolamide, methazolamide       medicines for gout       medicines that treat or prevent blood clots like enoxaparin, heparin, ticlopidine, warfarin       other aspirin and aspirin-like medicines       NSAIDs, medicines for pain and inflammation, like ibuprofen or naproxen       pemetrexed       sulfinpyrazone       varicella live vaccine     This list may not describe all possible interactions. Give your health care provider a list of all the medicines, herbs, non-prescription drugs, or dietary supplements you use. Also tell them if you smoke, drink alcohol, or use illegal drugs. Some items may interact with your medicine.   What should I watch for while using this medicine?   If you are treating yourself for pain, tell your doctor or health care provider if the pain lasts more than 10 days, if it gets worse, or if there is a new or different kind of  pain. Tell your doctor if you see redness or swelling. Also, check with your doctor if you have a fever that lasts for more than 3 days. Only take this medicine to prevent heart attacks or blood clotting if prescribed by your doctor or health care provider.   Do not take other medicines that contain aspirin, ibuprofen, or naproxen with this medicine. Side effects such as stomach upset, nausea, or ulcers may be more likely to occur. Many non-prescription medicines contain aspirin, ibuprofen, or naproxen. Always read labels carefully.   This medicine can cause serious ulcers and bleeding in the stomach. It can happen with no warning. Smoking, drinking alcohol, older age, and poor health can also increase risks. Call your health care provider right away if you have stomach pain or blood in your vomit or stool.   This medicine does not prevent a heart attack or stroke. This medicine may increase the chance of a heart attack or stroke. The chance may increase the longer you use this medicine or if you have heart disease. If you take aspirin to prevent a heart attack or stroke, talk to your health care provider about using this medicine.   Alcohol may interfere with the effect of this medicine. Avoid alcoholic drinks.   This medicine may cause serious skin reactions. They can happen weeks to months after starting the medicine. Contact your health care provider right away if you notice fevers or flu-like symptoms with a rash. The rash may be red or purple and then turn into blisters or peeling of the skin. Or, you might notice a red rash with swelling of the face, lips or lymph nodes in your neck or under your arms.   Talk to your health care provider if you are pregnant before taking this medicine. Taking this medicine between weeks 20 and 30 of pregnancy may harm your unborn baby. Your health care provider will monitor you closely if you need to take it. After 30 weeks of pregnancy, do not take this medicine.   You may  get drowsy or dizzy. Do not drive, use machinery, or do anything that needs mental alertness until you know how this medicine affects you. Do not stand up or sit up quickly, especially if you are an older patient. This reduces the risk of dizzy or fainting spells.   Be careful brushing or flossing your teeth or using a toothpick because you may get an infection or bleed more easily. If you have any dental work done, tell your dentist you are receiving this medicine.   This medicine may make it more difficult to get pregnant. Talk to your health care provider if you are concerned about your fertility.   What side effects may I notice from receiving this medicine?   Side effects that you should report to your doctor or health care provider as soon as possible:         allergic reactions (skin rash, itching or hives; swelling of the face, lips, or tongue)       bleeding (bloody or black, tarry stools; red or dark brown urine; spitting up blood or brown material that looks like coffee grounds; red spots on the skin; unusual bruising or bleeding from the eyes, gums, or nose)       blood clot (chest pain; shortness of breath; pain, swelling, or warmth in the leg)       blurred vision OR changes in vision       heart failure (trouble breathing; fast, irregular heartbeat; sudden weight gain; swelling of the ankles, feet, hands; unusually weak or tired)       heart attack (trouble breathing; pain or tightness in the chest, neck, back or arms; unusually weak or tired)       kidney injury (trouble passing urine or change in the amount of urine)       light-colored stool       liver injury (dark yellow or brown urine; general ill feeling or flu-like symptoms; loss of appetite, right upper belly pain; unusually weak or tired, yellowing of the eyes or skin)       low red blood cell counts (trouble breathing; feeling faint; lightheaded, falls; unusually weak or tired)       rash, fever, and swollen lymph nodes       redness,  blistering, peeling, or loosening of the skin, including inside the mouth       stroke (changes in vision; confusion; trouble speaking or understanding; severe headaches; sudden numbness or weakness of the face, arm or leg; trouble walking; dizziness; loss of balance or coordination)       trouble breathing     Side effects that usually do not require medical attention (report to your doctor or health care professional if they continue or are bothersome):         constipation       diarrhea       dizziness       headache       nausea, vomiting       passing gas     This list may not describe all possible side effects. Call your doctor for medical advice about side effects. You may report side effects to FDA at 5-043-UDO-6020.   Where should I keep my medicine?   Keep out of the reach of children and pets.   Store at room temperature between 15 and 30 degrees C (59 and 86 degrees F). Protect from heat and moisture. Do not use this medicine if it has a strong vinegar smell. Throw away any unused medicine after the expiration date.   Get rid of any unused medicine after the expiration date.   To get rid of medicines that are no longer needed or have :         Take the medicine to a medicine take-back program. Check with your pharmacy or law enforcement to find a location.       If you cannot return the medicine, check the label or package insert to see if the medicine should be thrown out in the garbage or flushed down the toilet. If you are not sure, ask your health care provider. If it is safe to put it in the trash, empty the medicine out of the container. Mix the medicine with cat litter, dirt, coffee grounds, or other unwanted substance. Seal the mixture in a bag or container. Put it in the trash.     NOTE: This sheet is a summary. It may not cover all possible information. If you have questions about this medicine, talk to your doctor, pharmacist, or health care provider.     ?  Elsevier/Gold Standard  (2021-11-13 15:14:45)         Atorvastatin Tablets     What is this medicine?   ATORVASTATIN (a TORE va sta tin) is a statin. It lowers bad cholesterol and triglyceride levels in the blood. It also increases good cholesterol levels. It is used with lifestyle changes, like diet and exercise. It may be used alone or with other drugs.   This medicine may be used for other purposes; ask your health care provider or pharmacist if you have questions.   COMMON BRAND NAME(S): Lipitor   What should I tell my health care provider before I take this medicine?   They need to know if you have any of these conditions:         diabetes (high blood sugar)       if you often drink alcohol       kidney disease       liver disease       muscle cramps, pain       stroke       thyroid disease       an unusual or allergic reaction to atorvastatin, other medicines, foods, dyes, or preservatives       pregnant or trying to get pregnant       breast-feeding     How should I use this medicine?   Take this medicine by mouth. Take it as directed on the prescription label at the same time every day. You can take it with or without food. If it upsets your stomach, take it with food. Keep taking it unless your health care provider tells you to stop.   Do not take this medicine with grapefruit juice.   Talk to your health care provider about the use of this medicine in children. While it may be prescribed for children as young as 10 for selected conditions, precautions do apply.   Overdosage: If you think you have taken too much of this medicine contact a poison control center or emergency room at once.   NOTE: This medicine is only for you. Do not share this medicine with others.   What if I miss a dose?   If you miss a dose, take it as soon as you can. If it is almost time for your next dose, take only that dose. Do not take double or extra doses.   What may interact with this medicine?   Do not take this medicine with any of the following  medications:         dasabuvir; ombitasvir; paritaprevir; ritonavir       ombitasvir; paritaprevir; ritonavir       posaconazole       red yeast rice     This medicine may also interact with the following medications:         alcohol       birth control pills       certain antibiotics like erythromycin and clarithromycin       certain antivirals for HIV or hepatitis       certain medicines for cholesterol like fenofibrate, gemfibrozil, and niacin       certain medicines for fungal infections like ketoconazole and itraconazole       colchicine       cyclosporine       digoxin       grapefruit juice       rifampin     This list may not describe all possible interactions. Give your health care provider a list of all the medicines, herbs, non-prescription drugs, or dietary supplements you use. Also tell them if you smoke, drink alcohol, or use illegal drugs. Some items may interact with your medicine.   What should I watch for while using this medicine?   Visit your health care provider for regular checks on your progress. Tell your health care provider if your symptoms do not start to get better or if they get worse.   Your health care provider may tell you to stop taking this medicine if you develop muscle problems. If your muscle problems do not go away after stopping this medicine, contact your health care provider.   Do not become pregnant while taking this medicine. Women should inform their health care provider if they wish to become pregnant or think they might be pregnant. There is potential for serious harm to an unborn child. Talk to your health care provider for more information. Do not breast-feed an infant while taking this medicine.   Birth control may not work properly while you are taking this medicine. Talk to your health care provider about using an extra method of birth control.   This medicine may increase blood sugar. Ask your health care provider if changes in diet or medicines are needed if you  have diabetes.   If you are going to need surgery or other procedure, tell your health care provider that you are using this medicine.   Taking this medicine is only part of a total heart healthy program. Your health care provider may give you a special diet to follow. Avoid alcohol. Avoid smoking. Ask your health care provider how much you should exercise.   What side effects may I notice from receiving this medicine?   Side effects that you should report to your doctor or health care provider as soon as possible:         allergic reactions (skin rash, itching or hives; swelling of the face, lips, or tongue)       high blood sugar (increased hunger, thirst or urination; unusually weak or tired, blurry vision)       infection (fever, chills, cough, sore throat, pain or trouble passing urine)       joint pain       liver injury (dark yellow or brown urine; general ill feeling or flu-like symptoms; loss of appetite, right upper belly pain; unusually weak or tired, yellowing of the eyes or skin)       muscle injury (dark urine; trouble passing urine or change in the amount of urine; unusually weak or tired; muscle pain; back pain)       redness, blistering, peeling, or loosening of the skin, including inside the mouth     Side effects that usually do not require medical attention (report to your doctor or health care provider if they continue or are bothersome):         diarrhea       nausea       upset stomach     This list may not describe all possible side effects. Call your doctor for medical advice about side effects. You may report side effects to FDA at 0-331-FDA-8349.   Where should I keep my medicine?   Keep out of the reach of children and pets.   Store at room temperature between 20 and 25 degrees C (68 and 77 degrees F). Get rid of any unused medicine after the expiration date.   To get rid of medicines that are no longer needed or have :         Take the medicine to a medicine take-back program.  Check with your pharmacy or law enforcement to find a location.       If you cannot return the medicine, check the label or package insert to see if the medicine should be thrown out in the garbage or flushed down the toilet. If you are not sure, ask your health care provider. If it is safe to put it in the trash, take the medicine out of the container. Mix the medicine with cat litter, dirt, coffee grounds, or other unwanted substance. Seal the mixture in a bag or container. Put it in the trash.     NOTE: This sheet is a summary. It may not cover all possible information. If you have questions about this medicine, talk to your doctor, pharmacist, or health care provider.     ? 2021 Elsevier/Gold Standard (2021-12-02 12:41:57)         Heart Attack     A heart attack occurs when blood and oxygen supply to the heart is cut off. A heart attack causes damage to the heart that cannot be fixed. A heart attack is also called a myocardial infarction, or MI. If you think you are having a heart attack, do not wait to see if the symptoms will go away. Get medical help right away.   What are the causes?       This condition may be caused by:       A fatty substance (plaque) in the blood vessels (arteries). This can block the flow of blood to the heart.       A blood clot in the blood vessels that go to the heart. The blood clot blocks blood flow.       Low blood pressure.       An abnormal heartbeat.       Some diseases, such as problems in red blood cells (anemia)orproblems in breathing (respiratory failure).       Tightening (spasm) of a blood vessel that cuts off blood to the heart.       A tear in a blood vessel of the heart.       High blood pressure.       What increases the risk?    The following factors may make you more likely to develop this condition:       Aging. The older you are, the higher your risk.       Having a personal or family history of chest pain, heart attack, stroke, or narrowing of the arteries in the  legs, arms, head, or stomach (peripheral artery disease).       Being male.       Smoking.       Not getting regular exercise.       Being overweight or obese.       Having high blood pressure.       Having high cholesterol.       Having diabetes.       Drinking too much alcohol.       Using illegal drugs, such as cocaine or methamphetamine.     What are the signs or symptoms?    Symptoms of this condition include:      Chest pain. It may feel like:       Crushing or squeezing.       Tightness, pressure, fullness, or heaviness.       Pain in the arm, neck, jaw, back, or upper body.       Shortness of breath.       Heartburn.       Upset stomach (indigestion).       Feeling like you may vomit (nauseous).       Cold sweats.       Feeling tired.       Sudden light-headedness.     How is this treated?       A heart attack must be treated as soon as possible. Treatment may include:      Medicines to:       Break up or dissolve blood clots.       Thin blood and help prevent blood clots.       Treat blood pressure.       Improve blood flow to the heart.       Reduce pain.       Reduce cholesterol.       Procedures to widen a blocked artery and keep it open.       Open heart surgery.       Receiving oxygen.       Making your heart strong again (cardiac rehabilitation) through exercise, education, and counseling.       Follow these instructions at home:   Medicines        Take over-the-counter and prescription medicines only as told by your doctor. You may need to take medicine:       To keep your blood from clotting too easily.       To control blood pressure.       To lower cholesterol.       To control heart rhythms.      Do not  take these medicines unless your doctor says it is okay:       NSAIDs, such as ibuprofen.       Supplements that have vitamin A, vitamin E, or both.       Hormone replacement therapy that has estrogen with or without progestin.     Lifestyle              Do not  use any products that have  nicotine or tobacco, such as cigarettes, e-cigarettes, and chewing tobacco. If you need help quitting, ask your doctor.       Avoid secondhand smoke.       Exercise regularly. Ask your doctor about a cardiac rehab program.       Eat heart-healthy foods. Your doctor will tell you what foods to eat.       Stay at a healthy weight.       Lower your stress level.      Do not  use illegal drugs.       Alcohol use        Do not  drink alcohol if:       Your doctor tells you not to drink.       You are pregnant, may be pregnant, or are planning to become pregnant.      If you drink alcohol:      Limit how much you use to:       0?1 drink a day for women.       0?2 drinks a day for men.       Know how much alcohol is in your drink. In the [place] one drink equals one 12 oz bottle of beer (355 mL), one 5 oz glass of wine (148 mL), or one 1? oz glass of hard liquor (44 mL).     General instructions         Work with your doctor to treat other problems you may have, such as diabetes or high blood pressure.       Get screened for depression. Get treatment if needed.       Keep your vaccines up to date. Get the flu shot (influenza vaccine) every year.       Keep all follow-up visits as told by your doctor. This is important.       Contact a doctor if:         You feel very sad.       You have trouble doing your daily activities.     Get help right away if:         You have sudden, unexplained discomfort in your chest, arms, back, neck, jaw, or upper body.       You have shortness of breath.       You have sudden sweating or clammy skin.       You feel like you may vomit.       You vomit.       You feel tired or weak.       You get light-headed or dizzy.       You feel your heart beating fast.       You feel your heart skipping beats.       You have blood pressure that is higher than 180/120.     These symptoms may be an emergency. Do not wait to see if the symptoms will go away. Get medical help right away. Call your local  emergency services (911 in the [place] Do not drive yourself to the hospital.   Summary         A heart attack occurs when blood and oxygen supply to the heart is cut off.      Do not  take NSAIDs unless your doctor says it is okay.      Do not  smoke. Avoid secondhand smoke.       Exercise regularly. Ask your doctor about a cardiac rehab program.     This information is not intended to replace advice given to you by your health care provider. Make sure you discuss any questions you have with your health care provider.     Document Released: 06/18/2013Document Revised: 03/30/2020Document Reviewed: 03/30/2020     [employer] Patient Education ? 2021 [employer]         Heart-Healthy Eating Plan     Many factors influence your heart (coronary) health, including eating and exercise habits. Coronary risk increases with abnormal blood fat (lipid) levels. Heart-healthy meal planning includes limiting unhealthy fats, increasing healthy fats, and making other diet and lifestyle changes.   What is my plan?    Your health care provider may recommend that you:       Limit your fat intake to _________% or less of your total calories each day.       Limit your saturated fat intake to _________% or less of your total calories each day.       Limit the amount of cholesterol in your diet to less than _________ mg per day.     What are tips for following this plan?   Cooking    Cook foods using methods other than frying. Baking, boiling, grilling, and broiling are all good options. Other ways to reduce fat include:       Removing the skin from poultry.       Removing all visible fats from meats.       Steaming vegetables in water or broth.     Meal planning           At meals, imagine dividing your plate into fourths:       Fill one-half of your plate with vegetables and green salads.       Fill one-fourth of your plate with whole grains.       Fill one-fourth of your plate with lean protein foods.       Eat 4?5 servings of vegetables  per day. One serving equals 1 cup raw or cooked vegetable, or 2 cups raw leafy greens.       Eat 4?5 servings of fruit per day. One serving equals 1 medium whole fruit, ? cup dried fruit, ? cup fresh, frozen, or canned fruit, or ? cup 100% fruit juice.       Eat more foods that contain soluble fiber. Examples include apples, broccoli, carrots, beans, peas, and barley. Aim to get 25?30 g of fiber per day.       Increase your consumption of legumes, nuts, and seeds to 4?5 servings per week. One serving of dried beans or legumes equals ? cup cooked, 1 serving of nuts is ? cup, and 1 serving of seeds equals 1 tablespoon.     Fats         Choose healthy fats more often. Choose monounsaturated and polyunsaturated fats, such as olive and canola oils, flaxseeds, walnuts, almonds, and seeds.       Eat more omega-3 fats. Choose salmon, mackerel, sardines, tuna, flaxseed oil, and ground flaxseeds. Aim to eat fish at least 2 times each week.       Check food labels carefully to identify foods with trans fats or high amounts of saturated fat.       Limit saturated fats. These are found in animal products, such as meats, butter, and cream. Plant sources of saturated fats include palm oil, palm kernel oil, and coconut oil.       Avoid foods with partially hydrogenated oils in them. These contain trans fats. Examples are stick margarine, some tub margarines, cookies, crackers, and other baked goods.       Avoid fried foods.     General information         Eat more home-cooked food and less restaurant, buffet, and fast food.       Limit or avoid alcohol.       Limit foods that are high in starch and sugar.       Lose weight if you are overweight. Losing just 5?10% of your body weight can help your overall health and prevent diseases such as diabetes and heart disease.       Monitor your salt (sodium) intake, especially if you have high blood pressure. Talk with your health care provider about your sodium intake.       Try to  incorporate more vegetarian meals weekly.       What foods can I eat?   Fruits   All fresh, canned (in natural juice), or frozen fruits.   Vegetables   Fresh or frozen vegetables (raw, steamed, roasted, or grilled). Green salads.   Grains   Most grains. Choose whole wheat and whole grains most of the time. Rice and pasta, including brown rice and pastas made with whole wheat.   Meats and other proteins   Lean, well-trimmed beef, veal, pork, and lamb. Chicken and turkey without skin. All fish and shellfish. Wild duck, rabbit, pheasant, and venison. Egg whites or low-cholesterol egg substitutes. Dried beans, peas, lentils, and tofu. Seeds and most nuts.   Dairy   Low-fat or nonfat cheeses, including ricotta and mozzarella. Skim or 1% milk (liquid, powdered, or evaporated). Buttermilk made with low-fat milk. Nonfat or low-fat yogurt.   Fats and oils   Non-hydrogenated (trans-free) margarines. Vegetable oils, including soybean, sesame, sunflower, olive, peanut, safflower, corn, canola, and cottonseed. Salad dressings or mayonnaise made with a vegetable oil.   Beverages   Water (mineral or sparkling). Coffee and tea. Diet carbonated beverages.   Sweets and desserts   Sherbet, gelatin, and fruit ice. Small amounts of dark chocolate.   Limit all sweets and desserts.   Seasonings and condiments   All seasonings and condiments.   The items listed above may not be a complete list of foods and beverages you can eat. Contact a dietitian for more options.   What foods are not recommended?   Fruits   Canned fruit in heavy syrup. Fruit in cream or butter sauce. Fried fruit. Limit coconut.   Vegetables   Vegetables cooked in cheese, cream, or butter sauce. Fried vegetables.   Grains   Breads made with saturated or trans fats, oils, or whole milk. Croissants. Sweet rolls. Donuts. High-fat crackers, such as cheese crackers.   Meats and other proteins   Fatty meats, such as hot dogs, ribs, sausage, acuna, rib-eye roast or steak.  High-fat deli meats, such as salami and bologna. Caviar. Domestic duck and goose. Organ meats, such as liver.   Dairy   Cream, sour cream, cream cheese, and creamed cottage cheese. Whole milk cheeses. Whole or 2% milk (liquid, evaporated, or condensed). Whole buttermilk. Cream sauce or high-fat cheese sauce. Whole-milk yogurt.   Fats and oils   Meat fat, or shortening. Cocoa butter, hydrogenated oils, palm oil, coconut oil, palm kernel oil. Solid fats and shortenings, including acuna fat, salt pork, lard, and butter. Nondairy cream substitutes. Salad dressings with cheese or sour cream.   Beverages   Regular sodas and any drinks with added sugar.   Sweets and desserts   Frosting. Pudding. Cookies. Cakes. Pies. Milk chocolate or white chocolate. Buttered syrups. Full-fat ice cream or ice cream drinks.   The items listed above may not be a complete list of foods and beverages to avoid. Contact a dietitian for more information.   Summary         Heart-healthy meal planning includes limiting unhealthy fats, increasing healthy fats, and making other diet and lifestyle changes.       Lose weight if you are overweight. Losing just 5?10% of your body weight can help your overall health and prevent diseases such as diabetes and heart disease.       Focus on eating a balance of foods, including fruits and vegetables, low-fat or nonfat dairy, lean protein, nuts and legumes, whole grains, and heart-healthy oils and fats.     This information is not intended to replace advice given to you by your health care provider. Make sure you discuss any questions you have with your health care provider.     Document Released: 09/26/2009Document Revised: 01/25/2019Document Reviewed: 01/25/2019     [employer] Patient Education ? 2021 [employer]         Metoprolol Tablets     What is this medicine?   METOPROLOL (me TOE proe lole) is a beta blocker. It decreases the amount of work your heart has to do and helps your heart beat regularly. It  is used to treat high blood pressure and/or prevent chest pain (also called angina). It is also used after a heart attack to prevent a second one.   This medicine may be used for other purposes; ask your health care provider or pharmacist if you have questions.   COMMON BRAND NAME(S): Lopressor   What should I tell my health care provider before I take this medicine?   They need to know if you have any of these conditions:         diabetes       heart or vessel disease like slow heart rate, worsening heart failure, heart block, sick sinus syndrome or Raynaud's disease       kidney disease       liver disease       lung or breathing disease, like asthma or emphysema       pheochromocytoma       thyroid disease       an unusual or allergic reaction to metoprolol, other beta-blockers, medicines, foods, dyes, or preservatives       pregnant or trying to get pregnant       breast-feeding     How should I use this medicine?   Take this drug by mouth with water. Take it as directed on the prescription label at the same time every day. You can take it with or without food. You should always take it the same way. Keep taking it unless your health care provider tells you to stop.   Talk to your health care provider about the use of this drug in children. Special care may be needed.   Overdosage: If you think you have taken too much of this medicine contact a poison control center or emergency room at once.   NOTE: This medicine is only for you. Do not share this medicine with others.   What if I miss a dose?   If you miss a dose, take it as soon as you can. If it is almost time for your next dose, take only that dose. Do not take double or extra doses.   What may interact with this medicine?   This medicine may interact with the following medications:         certain medicines for blood pressure, heart disease, irregular heart beat       certain medicines for depression like monoamine oxidase (MAO) inhibitors, fluoxetine, or  paroxetine       clonidine       dobutamine       epinephrine       isoproterenol       reserpine     This list may not describe all possible interactions. Give your health care provider a list of all the medicines, herbs, non-prescription drugs, or dietary supplements you use. Also tell them if you smoke, drink alcohol, or use illegal drugs. Some items may interact with your medicine.   What should I watch for while using this medicine?   Visit your doctor or health care professional for regular check ups. Contact your doctor right away if your symptoms worsen. Check your blood pressure and pulse rate regularly. Ask your health care professional what your blood pressure and pulse rate should be, and when you should contact them.   You may get drowsy or dizzy. Do not drive, use machinery, or do anything that needs mental alertness until you know how this medicine affects you. Do not sit or stand up quickly, especially if you are an older patient. This reduces the risk of dizzy or fainting spells. Contact your doctor if these symptoms continue. Alcohol may interfere with the effect of this medicine. Avoid alcoholic drinks.   This medicine may increase blood sugar. Ask your healthcare provider if changes in diet or medicines are needed if you have diabetes.   What side effects may I notice from receiving this medicine?   Side effects that you should report to your doctor or health care professional as soon as possible:         allergic reactions like skin rash, itching or hives       cold or numb hands or feet       depression       difficulty breathing       faint       fever with sore throat       irregular heartbeat, chest pain       rapid weight gain       signs and symptoms of high blood sugar such as being more thirsty or hungry or having to urinate more than normal. You may also feel very tired or have blurry vision.       swollen legs or ankles     Side effects that usually do not require medical attention  (report to your doctor or health care professional if they continue or are bothersome):         anxiety or nervousness       change in sex drive or performance       dry skin       headache       nightmares or trouble sleeping       short term memory loss       stomach upset or diarrhea     This list may not describe all possible side effects. Call your doctor for medical advice about side effects. You may report side effects to FDA at 4-172-VMJ-5531.   Where should I keep my medicine?   Keep out of the reach of children and pets.   Store at room temperature between 15 and 30 degrees C (59 and 86 degrees F). Protect from moisture. Keep the container tightly closed. Throw away any unused drug after the expiration date.   NOTE: This sheet is a summary. It may not cover all possible information. If you have questions about this medicine, talk to your doctor, pharmacist, or health care provider.     ? 2021 [employer]/Gold Standard (2020-07-30 17:21:17)         Ticagrelor oral tablet     What is this medicine?   TICAGRELOR (NEMESIO ka GREL or) helps to prevent blood clots. It lowers the risk of heart attacks or stroke in people with coronary artery disease (such as chest pain or heart attack) or a previous stroke or mini-stroke. It is in a class of drugs called anti-platelets.   This medicine may be used for other purposes; ask your health care provider or pharmacist if you have questions.   COMMON BRAND NAME(S): JENTA   What should I tell my health care provider before I take this medicine?   They need to know if you have any of these conditions:         bleeding disorders       bleeding in the brain       colon polyps       having surgery       history of irregular heartbeat or rhythm       history of stomach bleeding       liver disease       lung or breathing disease (asthma, COPD)       severe injury       sleep apnea       stomach ulcer       stroke or transient ischemic attack       take medicines that treat or  prevent blood clots       an unusual or allergic reaction to ticagrelor, other medicines, foods, dyes, or preservatives       pregnant or trying to get pregnant       breast-feeding     How should I use this medicine?   Take this medicine by mouth. Swallow the tablets whole. You may also crush the tablets. After crushing the tablets, put the contents in a glass of water. Swallow the medicine and water right away. Refill the glass with water, stir, and drink all of the water. You can take it with or without food. If it upsets your stomach, take it with food. For your therapy to work as well as possible, take each dose exactly as prescribed on the prescription label. Do not skip doses. Skipping doses or stopping this medicine can increase your risk of a blood clot or stroke. Keep taking this medicine unless your health care provider tells you to stop.   A special MedGuide will be given to you by the pharmacist with each prescription and refill. Be sure to read this information carefully each time.   Talk to your health care provider about the use of this medicine in children. Special care may be needed.   Overdosage: If you think you have taken too much of this medicine contact a poison control center or emergency room at once.   NOTE: This medicine is only for you. Do not share this medicine with others.   What if I miss a dose?   If you miss a dose, take it as soon as you can. If it is almost time for your next dose, take only that dose. Do not take double or extra doses.   What may interact with this medicine?   Do not take this medicine with any of the following medications:         avanafil       defibrotide       itraconazole     This medicine may also interact with the following medications:         aspirin       certain antibiotics like clarithromycin, telithromycin, and rifampin       certain antiviral medicines for HIV or AIDS like atazanavir, indinavir, nelfinavir, ritonavir, and saquinavir       certain  medicines for cholesterol like lovastatin and simvastatin       certain medicines for fungal infections like itraconazole, ketoconazole and voriconazole       certain medicines for seizures like carbamazepine, phenobarbital, and phenytoin       digoxin       narcotic medicines for pain       nefazodone     This list may not describe all possible interactions. Give your health care provider a list of all the medicines, herbs, non-prescription drugs, or dietary supplements you use. Also tell them if you smoke, drink alcohol, or use illegal drugs. Some items may interact with your medicine.   What should I watch for while using this medicine?   Visit your health care provider for regular checks on your progress. Your condition will be monitored carefully while you are receiving this medicine. It is important not to miss any appointments.   This medicine may increase your risk to bruise or bleed. Call your health care provider if you notice any unusual bleeding.   Avoid sports or activities that might cause injury while you are using this medicine. Severe falls or injuries can cause unseen bleeding. Be careful when using sharp tools or knives. Consider using an electric razor. Take special care brushing or flossing your teeth. Report any injuries, bruising, or red spots on the skin to your health care provider.   If you are going to have surgery or other procedure, tell your health care provider that you are taking this medicine.   Tell your dentist and dental surgeon that you are taking this medicine. You should not have major dental surgery while on this medicine. See your dentist to have a dental exam and fix any dental problems before starting this medicine. Take good care of your teeth while on this medicine. Make sure to see your dentist for regular cleanings.   You should take aspirin every day with this medicine. Do not take more than 100 mg of aspirin per day.   Wear a medical ID bracelet or chain. Carry a card  that describes your condition. List the medicines and doses you take on the card.   What side effects may I notice from receiving this medicine?   Side effects that you should report to your doctor or health care professional as soon as possible:         allergic reactions (skin rash, itching or hives; swelling of the face, lips, or tongue)       bleeding (bloody or black, tarry stools; red or dark brown urine; spitting up blood or brown material that looks like coffee ground; red spots on the skin; unusual bruising or bleeding from the eyes, gums, or nose)       heartbeat rhythm changes (trouble breathing; chest pain; dizziness; irregular heartbeat; feeling faint or lightheaded, falls)       trouble breathing, especially during sleep (breathing faster, then slower; short pauses in breathing; loud snoring)     Side effects that usually do not require medical attention (report to your doctor or health care professional if they continue or are bothersome):         diarrhea       headache       nausea       tiredness     This list may not describe all possible side effects. Call your doctor for medical advice about side effects. You may report side effects to FDA at 8-432-FDA-6718.   Where should I keep my medicine?   Keep out of the reach of children and pets.   Store at room temperature between 20 and 25 degrees C (68 and 77 degrees F). Get rid of any unused medicine after the expiration date.   To get rid of medicines that are no longer needed or have :         Take the medicine to a medicine take-back program. Check with your pharmacy or law enforcement to find a location.       If you cannot return the medicine, check the label or package insert to see if the medicine should be thrown out in the garbage or flushed down the toilet. If you are not sure, ask your health care provider. If it is safe to put it in the trash, empty the medicine out of the container. Mix the medicine with cat litter, dirt, coffee  grounds, or other unwanted substance. Seal the mixture in a bag or container. Put it in the trash.     NOTE: This sheet is a summary. It may not cover all possible information. If you have questions about this medicine, talk to your doctor, pharmacist, or health care provider.     ? 2021 [employer]/Gold Standard (2021-08-12 23:55:35)

## 2022-03-05 NOTE — NURSING NOTE
Discharge education completed with patient and sibling at bedside. Patient instructed on follow up appointments amd numbers and addresses provided. Patient started on several new medications. Each one was discussed and written material provided on each. Patient educated on radial site care following a heart cath. Pt verbalized understanding off all education and verbalized no further questions or concerns. IV was removed. All belongings accounted for. P delevired medications to  Patients room.

## 2022-03-06 ENCOUNTER — READMISSION MANAGEMENT (OUTPATIENT)
Dept: CALL CENTER | Facility: HOSPITAL | Age: 57
End: 2022-03-06

## 2022-03-06 NOTE — OUTREACH NOTE
Prep Survey    Flowsheet Row Responses   [facility] facility patient discharged from? [facility]   Is LACE score < 7 ? Yes   Emergency Room discharge w/ pulse ox? No   Eligibility Readm Mgmt   Discharge diagnosis STEMI    Does the patient have one of the following disease processes/diagnoses(primary or secondary)? Acute MI (STEMI,NSTEMI)   Does the patient have Home health ordered? No   Is there a DME ordered? No   Prep survey completed? Yes          MICAH FREY - Registered Nurse

## 2022-03-08 NOTE — PAYOR COMM NOTE
"Ritesh Quevedo (56 y.o. Male)   Auth : [id number]  Requested for clinical .   Kitty Justina [phone] p, [phone] f            Date of Birth   1965    Social Security Number       Address   [address] [place], [place] [address]    Home Phone   [phone]    MRN   [id number]       [facility]   None    Marital Status   Single                            Admission Date   3/3/22    Admission Type   Elective    Admitting Provider   Jean Cooper IV, MD    Attending Provider       Department, Room/Bed   [facility] 4G, S460/1       Discharge Date   3/5/2022    Discharge Disposition   Home or Self Care    Discharge Destination                               Attending Provider: (none)   Allergies: No Known Allergies    Isolation: None   Infection: None   Code Status: Prior   Advance Care Planning Activity    Ht: 187.2 cm (73.7\")   Wt: 101 kg (222 lb 10.6 oz)    Admission Cmt: None   Principal Problem: STEMI (ST elevation myocardial infarction) (HCC) [I21.3] More...                 Active Insurance as of 3/3/2022     Primary Coverage     Payor Plan Insurance Group Employer/Plan Group    [employer]CIAL AETNA [id number]     Payor Plan Address Payor Plan Phone Number Payor Plan Fax Number Effective Dates    [address] [phone]  10/1/2021 - None Entered    [facility] [id number]       Subscriber Name Subscriber Birth Date Member ID       RITESH QUEVEDO 1965 [id number]                 Emergency Contacts      (Rel.) Home Phone Work Phone Mobile Phone    cynthia Joyner (Sister) -- -- [phone]    Fiona Quevedo (Mother) -- -- [phone]               Discharge Summaryl      Edmund Steele, RN at 03/05/22 1224        Goal Outcome Evaluation:      Patient cleared for discharge.              Electronically signed by Edmund Steele, RN at 03/05/22 1233     Edmund Steele, RN at 03/05/22 1130        Discharge education completed with patient and " sibling at bedside. Patient instructed on follow up appointments amd numbers and addresses provided. Patient started on several new medications. Each one was discussed and written material provided on each. Patient educated on radial site care following a heart cath. Pt verbalized understanding off all education and verbalized no further questions or concerns. IV was removed. All belongings accounted for. [facility] delevired medications to  Patients room.      Electronically signed by Edmund Steele RN at 03/05/22 123     External Patient Instructions filed by Interface, See Report at 03/06/22 1244        Patient Education   Table of Contents     •  Acute Coronary Syndrome     •  Aspirin and Your Heart     •  Aspirin Tablets     •  Atorvastatin Tablets     •  Heart Attack     •  Heart-Healthy Eating Plan     •  Metoprolol Tablets     •  Ticagrelor oral tablet     To view videos and all your education online visit,   [email or web address]   or scan this QR code with your smartphone.                  Acute Coronary Syndrome     Acute coronary syndrome (ACS) is a serious problem in which there is suddenly not enough blood and oxygen reaching the heart. ACS can result in chest pain or a heart attack.   This condition is a medical emergency. If you have any symptoms of this condition, get help right away.   What are the causes?       This condition may be caused by:     •  A buildup of fat and cholesterol inside the arteries (atherosclerosis). This is the most common cause. The buildup (plaque) can cause blood vessels in the heart (coronary arteries) to become narrow or blocked, which reduces blood flow to the heart. Plaque can also break off and lead to a clot, which can block an artery and cause a heart attack or stroke.     •  Sudden tightening of the muscles around the coronary arteries (coronary spasm).     •  Tearing of a coronary artery (spontaneous coronary artery dissection).     •  Very low blood  pressure (hypotension).     •  An abnormal heartbeat (arrhythmia).     •  Other medical conditions that cause a decrease of oxygen to the heart, such as anemiaorrespiratory failure.     •  Using cocaine or methamphetamine.       What increases the risk?    The following factors may make you more likely to develop this condition:     •  Age. The risk for ACS increases as you get older.     •  History of chest pain, heart attack, peripheral artery disease, or stroke.     •  Having taken chemotherapy or immune-suppressing medicines.     •  Being male.     •  Family history of chest pain, heart disease, or stroke.     •  Smoking.     •  Not exercising enough.     •  Being overweight.     •  High cholesterol.     •  High blood pressure (hypertension).     •  Diabetes.     •  Excessive alcohol use.     What are the signs or symptoms?    Common symptoms of this condition include:     • Chest pain. The pain may last a long time, or it may stop and come back (recur). It may feel like:     •  Crushing or squeezing.     •  Tightness, pressure, fullness, or heaviness.     •  Arm, neck, jaw, or back pain.     •  Heartburn or indigestion.     •  Shortness of breath.     •  Nausea.     •  Sudden cold sweats.     •  Light-headedness.     •  Dizziness or passing out.     •  Tiredness (fatigue).     Sometimes there are no symptoms.   How is this diagnosed?    This condition may be diagnosed based on:     •  Your medical history and symptoms.     • Imaging tests, such as:     •  An electrocardiogram (ECG). This measures the heart's electrical activity.     •  X-rays.     •  CT scan.     •  A coronary angiogram. For this test, dye is injected into the heart arteries and then X-rays are taken.     •  Myocardial perfusion imaging. This test shows how well blood flows through your heart muscle.     •  Blood tests. These may be repeated at certain time intervals.     •  Exercise stress testing.     •  Echocardiogram. This is a test that  uses sound waves to produce detailed images of the heart.     How is this treated?    Treatment for this condition may include:     •  Oxygen therapy.     • Medicines, such as:     •  Antiplatelet medicines and blood-thinning medicines, such as aspirin. These help prevent blood clots.     •  Medicine that dissolves any blood clots (fibrinolytic therapy).     •  Blood pressure medicines.     •  Nitroglycerin. This helps widen blood vessels to improve blood flow.     •  Pain medicine.     •  Cholesterol-lowering medicine.     • Surgery, such as:     •  Coronary angioplasty with stent placement. This involves placing a small piece of metal that looks like mesh or a spring into a narrow coronary artery. This widens the artery and keeps it open.     •  Coronary artery bypass surgery. This involves taking a section of a blood vessel from a different part of your body and placing it on the blocked coronary artery to allow blood to flow around the blockage.     •  Cardiac rehabilitation. This is a program that includes exercise training, education, and counseling to help you recover.     Follow these instructions at home:   Eating and drinking       •  Eat a heart-healthy diet that includes whole grains, fruits and vegetables, lean proteins, and low-fat or nonfat dairy products.     •  Limit how much salt (sodium) you eat as told by your health care provider. Follow instructions from your health care provider about any other eating or drinking restrictions, such as limiting foods that are high in fat and processed sugars.     •  Use healthy cooking methods such as roasting, grilling, broiling, baking, poaching, steaming, or stir-frying.     •  Work with a dietitian to follow a heart-healthy eating plan.     Medicines       •  Take over-the-counter and prescription medicines only as told by your health care provider.     • Do not  take these medicines unless your health care provider approves:     •  Vitamin supplements that  contain vitamin A or vitamin E.     •  NSAIDs, such as ibuprofen, naproxen, or celecoxib.     •  Hormone replacement therapy that contains estrogen.     • If you are taking blood thinners:     •  Talk with your health care provider before you take any medicines that contain aspirin or NSAIDs. These medicines increase your risk for dangerous bleeding.     •  Take your medicine exactly as told, at the same time every day.     •  Avoid activities that could cause injury or bruising, and follow instructions about how to prevent falls.     •  Wear a medical alert bracelet, and carry a card that lists what medicines you take.     Activity       •  Follow your cardiac rehabilitation program. Do exercises as told by your physical therapist.     •  Ask your health care provider what activities and exercises are safe for you. Follow his or her instructions about lifting, driving, or climbing stairs.     Lifestyle       • Do not  use any products that contain nicotine or tobacco, such as cigarettes, e-cigarettes, and chewing tobacco. If you need help quitting, ask your health care provider.     • Do not  drink alcohol if your health care provider tells you not to drink.     • If you drink alcohol:     •  Limit how much you have to 0-1 drink a day.     •  Be aware of how much alcohol is in your drink. In the [place] one drink equals one 12 oz bottle of beer (355 mL), one 5 oz glass of wine (148 mL), or one 1½ oz glass of hard liquor (44 mL).     •  Maintain a healthy weight. If you need to lose weight, work with your health care provider to do so safely.     General instructions       •  Tell all the health care providers who provide care for you about your heart condition, including your dentist. This may affect the medicines or treatment you receive.     •  Manage any other health conditions you have, such as hypertension or diabetes. These conditions affect your heart.     • Pay attention to your mental health. You may be at  higher risk for depression.     •  Find ways to manage stress.     •  Talk to your health care provider about depression screening and treatment.     • Keep your vaccinations up to date.     •  Get the flu shot (influenza vaccine) every year.     •  Get the pneumococcal vaccine if you are age 65 or older.     •  If directed, monitor your blood pressure at home.     •  Keep all follow-up visits as told by your health care provider. This is important.     Contact a health care provider if you:       •  Feel overwhelmed or sad.     •  Have trouble doing your daily activities.     Get help right away if you:       • Have pain in your chest, neck, arm, jaw, stomach, or back that recurs, and:     •  It lasts for more than a few minutes.     •  It is not relieved by taking the medicineyour health care provider prescribed.     • Have unexplained:     •  Heavy sweating.     •  Heartburn or indigestion.     •  Nausea or vomiting.     •  Shortness of breath.     •  Difficulty breathing.     •  Fatigue.     •  Nervousness or anxiety.     •  Weakness.     •  Diarrhea.     •  Dark stools or blood in your stool.     •  Have sudden light-headedness or dizziness.     •  Have blood pressure that is higher than 180/120.     •  Faint.     •  Have thoughts about hurting yourself.     These symptoms may represent a serious problem that is an emergency. Do not wait to see if the symptoms will go away. Get medical help right away. Call your local emergency services (911 in the [place] Do not drive yourself to the hospital.   Summary       •  Acute coronary syndrome (ACS) is when there is not enough blood and oxygen being supplied to the heart. ACS can result in chest pain or a heart attack.     •  Acute coronary syndrome is a medical emergency. If you have any symptoms of this condition, get help right away.     •  Treatment includes medicines and procedures to open the blocked arteries and restore blood flow.     This information is not  intended to replace advice given to you by your health care provider. Make sure you discuss any questions you have with your health care provider.     Document Released: 12/18/2006Document Revised: 05/20/2020Document Reviewed: 12/30/2019     [employer] Patient Education © 2021 [employer]         Aspirin and Your Heart     Aspirin is a medicine that prevents the platelets in your blood from sticking together. Platelets are the cells that your blood uses for clotting. Aspirin can be used to help reduce the risk of blood clots, heart attacks, and other heart-related problems.   What are the risks?    Daily use of aspirin can cause side effects. Some of these include:     •  Bleeding. Bleeding can be minor or serious. An example of minor bleeding is bleeding from a cut, and the bleeding does not stop. An example of more serious bleeding is stomach bleeding or, rarely, bleeding into the brain. Your risk of bleeding increases if you are also taking NSAIDs, such as ibuprofen.     •  Increased bruising.     •  Upset stomach.     •  An allergic reaction. People who have growths inside the nose (nasal polyps) have an increased risk of developing an aspirin allergy.     How to use aspirin to care for your heart          • Take aspirin only as told by your health care provider. Make sure that you understand how much to take and what form to take. The two forms of aspirin are:     •  Non-enteric-coated.This type of aspirin does not have a coating and is absorbed quickly. This type of aspirin also comes in a chewable form.     •  Enteric-coated. This type of aspirin has a coating that releases the medicine very slowly. Enteric-coated aspirin might cause less stomach upset than non-enteric-coated aspirin. This type of aspirin should not be chewed or crushed.     • Work with your health care provider to find out whether it is safe and beneficial for you to take aspirin daily. Taking aspirin daily may be helpful if:     •  You have  had a heart attack or chest pain, or you are at risk for a heart attack.     • You have a condition in which certain heart vessels are blocked (coronary artery disease), and you have had a procedure to treat it. Examples are:     •  Open-heart surgery, such as coronary artery bypass surgery (CABG).     •  Coronary angioplasty,which is done to widen a blood vessel of your heart.     •  Having a small mesh tube, or stent, placed in your coronary artery.     •  You have had certain types of stroke or a mini-stroke known as a transient ischemic attack (TIA).     •  You have a narrowing of the arteries that supply the limbs (peripheral artery disease, or PAD).     •  You have long-term (chronic) heart rhythm problems, such as atrial fibrillation, and your health care provider thinks aspirin may help.     •  You have valve disease or have had surgery on a valve.     •  You are considered at increased risk of developing coronary artery disease or PAD.       Follow these instructions at home   Medicines       •  Take over-the-counter and prescription medicines only as told by your health care provider.     • If you are taking blood thinners:     •  Talk with your health care provider before you take any medicines that contain aspirin or NSAIDs, such as ibuprofen. These medicines increase your risk for dangerous bleeding.     •  Take your medicine exactly as told, at the same time every day.     •  Avoid activities that could cause injury or bruising, and follow instructions about how to prevent falls.     •  Wear a medical alert bracelet or carry a card that lists what medicines you take.     General instructions       • Do not  drink alcohol if:     •  Your health care provider tells you not to drink.     •  You are pregnant, may be pregnant, or are planning to become pregnant.     • If you drink alcohol:     • Limit how much you use to:     •  0-1 drink a day for women.     •  0-2 drinks a day for men.     •  Be aware of  "how much alcohol is in your drink. In the [place] one drink equals one 12 oz bottle of beer (355 mL), one 5 oz glass of wine (148 mL), or one 1½ oz glass of hard liquor (44 mL).     •  Keep all follow-up visits as told by your health care provider. This is important.     Where to find more information       •  The American Heart Association: www.heart.org       Contact a health care provider if you have:       •  Unusual bleeding or bruising.     •  Stomach pain or nausea.     •  Ringing in your ears.     •  An allergic reaction that causes hives, itchy skin, or swelling of the lips, tongue, or face.     Get help right away if:       •  You notice that your bowel movements are bloody, or dark red or black in color.     •  You vomit or cough up blood.     •  You have blood in your urine.     •  You cough, breathe loudly (wheeze), or feel short of breath.     •  You have chest pain, especially if the pain spreads to your arms, back, neck, or jaw.     •  You have a headache with confusion.      You have any symptoms of a stroke. \"BE FAST\"  is an easy way to remember the main warning signs of a stroke:     • B - Balance  . Signs are dizziness, sudden trouble walking, or loss of balance.     • E - Eyes  . Signs are trouble seeing or a sudden change in vision.     • F - Face  . Signs are sudden weakness or numbness of the face, or the face or eyelid drooping on one side.     • A - Arms  . Signs are weakness or numbness in an arm. This happens suddenly and usually on one side of the body.     • S - Speech  . Signs are sudden trouble speaking, slurred speech, or trouble understanding what people say.     • T - Time  . Time to call emergency services. Write down what time symptoms started.      You have other signs of a stroke, such as:     •  A sudden, severe headache with no known cause.     •  Nausea or vomiting.     •  Seizure.     These symptoms may represent a serious problem that is an emergency. Do not wait to see if " the symptoms will go away. Get medical help right away. Call your local emergency services (911 in the [place] Do not drive yourself to the hospital.   Summary       •  Aspirin use can help reduce the risk of blood clots, heart attacks, and other heart-related problems.     •  Daily use of aspirin can cause side effects.     •  Take aspirin only as told by your health care provider. Make sure that you understand how much to take and what form to take.     •  Your health care provider will help you determine whether it is safe and beneficial for you to take aspirin daily.     This information is not intended to replace advice given to you by your health care provider. Make sure you discuss any questions you have with your health care provider.     Document Released: 11/30/2009Document Revised: 09/21/2020Document Reviewed: 09/21/2020     [employer] Patient Education © 2021 [employer]         Aspirin Tablets     What is this medicine?   ASPIRIN (AS pir in) is a pain reliever. It is used to treat mild pain and fever. This medicine is also used as directed by a doctor to prevent and to treat heart attacks, to prevent strokes and blood clots, and to treat arthritis or inflammation.   This medicine may be used for other purposes; ask your health care provider or pharmacist if you have questions.   COMMON BRAND NAME(S): Aspir-Low, Aspir-Edwige, Aspirtab, Lorena Advanced Aspirin, Lorena Aspirin, Lorena Aspirin Extra Strength, Lorena Aspirin Plus, Lorena Extra Strength, Lorena Extra Strength Plus, Lorena Genuine Aspirin, Lorena Womens Aspirin, Bufferin, Bufferin Extra Strength, Bufferin Low Dose   What should I tell my health care provider before I take this medicine?   They need to know if you have any of these conditions:       •  anemia     •  asthma     •  bleeding problems     •  child with chickenpox, the flu, or other viral infection     •  diabetes     •  gout     •  if you frequently drink alcohol containing drinks     •   kidney disease     •  liver disease     •  low level of vitamin K     •  lupus     •  smoke tobacco     •  stomach ulcers or other problems     •  an unusual or allergic reaction to aspirin, tartrazine dye, other medicines, dyes, or preservatives     •  pregnant or trying to get pregnant     •  breast-feeding     How should I use this medicine?   Take this medicine by mouth with a glass of water. Follow the directions on the package or prescription label. You can take this medicine with or without food. If it upsets your stomach, take it with food. Do not take your medicine more often than directed.   Talk to your pediatrician regarding the use of this medicine in children. While this drug may be prescribed for children as young as 12 years of age for selected conditions, precautions do apply. Children and teenagers should not use this medicine to treat chicken pox or flu symptoms unless directed by a doctor.   Patients over 65 years old may have a stronger reaction and need a smaller dose.   Overdosage: If you think you have taken too much of this medicine contact a poison control center or emergency room at once.   NOTE: This medicine is only for you. Do not share this medicine with others.   What if I miss a dose?   If you are taking this medication on a regular schedule and miss a dose, take it as soon as you can. If it is almost time for your next dose, take only that dose. Do not take double or extra doses.   What may interact with this medicine?   Do not take this medicine with any of the following medications:       •  cidofovir     •  ketorolac     •  probenecid     This medicine may also interact with the following medications:       •  alcohol     •  alendronate     •  bismuth subsalicylate     •  flavocoxid     •  herbal supplements like feverfew, garlic, radha, ginkgo biloba, horse chestnut     •  medicines for diabetes or glaucoma like acetazolamide, methazolamide     •  medicines for gout     •   medicines that treat or prevent blood clots like enoxaparin, heparin, ticlopidine, warfarin     •  other aspirin and aspirin-like medicines     •  NSAIDs, medicines for pain and inflammation, like ibuprofen or naproxen     •  pemetrexed     •  sulfinpyrazone     •  varicella live vaccine     This list may not describe all possible interactions. Give your health care provider a list of all the medicines, herbs, non-prescription drugs, or dietary supplements you use. Also tell them if you smoke, drink alcohol, or use illegal drugs. Some items may interact with your medicine.   What should I watch for while using this medicine?   If you are treating yourself for pain, tell your doctor or health care provider if the pain lasts more than 10 days, if it gets worse, or if there is a new or different kind of pain. Tell your doctor if you see redness or swelling. Also, check with your doctor if you have a fever that lasts for more than 3 days. Only take this medicine to prevent heart attacks or blood clotting if prescribed by your doctor or health care provider.   Do not take other medicines that contain aspirin, ibuprofen, or naproxen with this medicine. Side effects such as stomach upset, nausea, or ulcers may be more likely to occur. Many non-prescription medicines contain aspirin, ibuprofen, or naproxen. Always read labels carefully.   This medicine can cause serious ulcers and bleeding in the stomach. It can happen with no warning. Smoking, drinking alcohol, older age, and poor health can also increase risks. Call your health care provider right away if you have stomach pain or blood in your vomit or stool.   This medicine does not prevent a heart attack or stroke. This medicine may increase the chance of a heart attack or stroke. The chance may increase the longer you use this medicine or if you have heart disease. If you take aspirin to prevent a heart attack or stroke, talk to your health care provider about using  this medicine.   Alcohol may interfere with the effect of this medicine. Avoid alcoholic drinks.   This medicine may cause serious skin reactions. They can happen weeks to months after starting the medicine. Contact your health care provider right away if you notice fevers or flu-like symptoms with a rash. The rash may be red or purple and then turn into blisters or peeling of the skin. Or, you might notice a red rash with swelling of the face, lips or lymph nodes in your neck or under your arms.   Talk to your health care provider if you are pregnant before taking this medicine. Taking this medicine between weeks 20 and 30 of pregnancy may harm your unborn baby. Your health care provider will monitor you closely if you need to take it. After 30 weeks of pregnancy, do not take this medicine.   You may get drowsy or dizzy. Do not drive, use machinery, or do anything that needs mental alertness until you know how this medicine affects you. Do not stand up or sit up quickly, especially if you are an older patient. This reduces the risk of dizzy or fainting spells.   Be careful brushing or flossing your teeth or using a toothpick because you may get an infection or bleed more easily. If you have any dental work done, tell your dentist you are receiving this medicine.   This medicine may make it more difficult to get pregnant. Talk to your health care provider if you are concerned about your fertility.   What side effects may I notice from receiving this medicine?   Side effects that you should report to your doctor or health care provider as soon as possible:       •  allergic reactions (skin rash, itching or hives; swelling of the face, lips, or tongue)     •  bleeding (bloody or black, tarry stools; red or dark brown urine; spitting up blood or brown material that looks like coffee grounds; red spots on the skin; unusual bruising or bleeding from the eyes, gums, or nose)     •  blood clot (chest pain; shortness of  breath; pain, swelling, or warmth in the leg)     •  blurred vision OR changes in vision     •  heart failure (trouble breathing; fast, irregular heartbeat; sudden weight gain; swelling of the ankles, feet, hands; unusually weak or tired)     •  heart attack (trouble breathing; pain or tightness in the chest, neck, back or arms; unusually weak or tired)     •  kidney injury (trouble passing urine or change in the amount of urine)     •  light-colored stool     •  liver injury (dark yellow or brown urine; general ill feeling or flu-like symptoms; loss of appetite, right upper belly pain; unusually weak or tired, yellowing of the eyes or skin)     •  low red blood cell counts (trouble breathing; feeling faint; lightheaded, falls; unusually weak or tired)     •  rash, fever, and swollen lymph nodes     •  redness, blistering, peeling, or loosening of the skin, including inside the mouth     •  stroke (changes in vision; confusion; trouble speaking or understanding; severe headaches; sudden numbness or weakness of the face, arm or leg; trouble walking; dizziness; loss of balance or coordination)     •  trouble breathing     Side effects that usually do not require medical attention (report to your doctor or health care professional if they continue or are bothersome):       •  constipation     •  diarrhea     •  dizziness     •  headache     •  nausea, vomiting     •  passing gas     This list may not describe all possible side effects. Call your doctor for medical advice about side effects. You may report side effects to FDA at 8-962-FDA-2007.   Where should I keep my medicine?   Keep out of the reach of children and pets.   Store at room temperature between 15 and 30 degrees C (59 and 86 degrees F). Protect from heat and moisture. Do not use this medicine if it has a strong vinegar smell. Throw away any unused medicine after the expiration date.   Get rid of any unused medicine after the expiration date.   To get rid  of medicines that are no longer needed or have :       •  Take the medicine to a medicine take-back program. Check with your pharmacy or law enforcement to find a location.     •  If you cannot return the medicine, check the label or package insert to see if the medicine should be thrown out in the garbage or flushed down the toilet. If you are not sure, ask your health care provider. If it is safe to put it in the trash, empty the medicine out of the container. Mix the medicine with cat litter, dirt, coffee grounds, or other unwanted substance. Seal the mixture in a bag or container. Put it in the trash.     NOTE: This sheet is a summary. It may not cover all possible information. If you have questions about this medicine, talk to your doctor, pharmacist, or health care provider.     ©  Elsevier/Gold Standard (2021 15:14:45)         Atorvastatin Tablets     What is this medicine?   ATORVASTATIN (a TORE va sta tin) is a statin. It lowers bad cholesterol and triglyceride levels in the blood. It also increases good cholesterol levels. It is used with lifestyle changes, like diet and exercise. It may be used alone or with other drugs.   This medicine may be used for other purposes; ask your health care provider or pharmacist if you have questions.   COMMON BRAND NAME(S): Lipitor   What should I tell my health care provider before I take this medicine?   They need to know if you have any of these conditions:       •  diabetes (high blood sugar)     •  if you often drink alcohol     •  kidney disease     •  liver disease     •  muscle cramps, pain     •  stroke     •  thyroid disease     •  an unusual or allergic reaction to atorvastatin, other medicines, foods, dyes, or preservatives     •  pregnant or trying to get pregnant     •  breast-feeding     How should I use this medicine?   Take this medicine by mouth. Take it as directed on the prescription label at the same time every day. You can take it  with or without food. If it upsets your stomach, take it with food. Keep taking it unless your health care provider tells you to stop.   Do not take this medicine with grapefruit juice.   Talk to your health care provider about the use of this medicine in children. While it may be prescribed for children as young as 10 for selected conditions, precautions do apply.   Overdosage: If you think you have taken too much of this medicine contact a poison control center or emergency room at once.   NOTE: This medicine is only for you. Do not share this medicine with others.   What if I miss a dose?   If you miss a dose, take it as soon as you can. If it is almost time for your next dose, take only that dose. Do not take double or extra doses.   What may interact with this medicine?   Do not take this medicine with any of the following medications:       •  dasabuvir; ombitasvir; paritaprevir; ritonavir     •  ombitasvir; paritaprevir; ritonavir     •  posaconazole     •  red yeast rice     This medicine may also interact with the following medications:       •  alcohol     •  birth control pills     •  certain antibiotics like erythromycin and clarithromycin     •  certain antivirals for HIV or hepatitis     •  certain medicines for cholesterol like fenofibrate, gemfibrozil, and niacin     •  certain medicines for fungal infections like ketoconazole and itraconazole     •  colchicine     •  cyclosporine     •  digoxin     •  grapefruit juice     •  rifampin     This list may not describe all possible interactions. Give your health care provider a list of all the medicines, herbs, non-prescription drugs, or dietary supplements you use. Also tell them if you smoke, drink alcohol, or use illegal drugs. Some items may interact with your medicine.   What should I watch for while using this medicine?   Visit your health care provider for regular checks on your progress. Tell your health care provider if your symptoms do not  start to get better or if they get worse.   Your health care provider may tell you to stop taking this medicine if you develop muscle problems. If your muscle problems do not go away after stopping this medicine, contact your health care provider.   Do not become pregnant while taking this medicine. Women should inform their health care provider if they wish to become pregnant or think they might be pregnant. There is potential for serious harm to an unborn child. Talk to your health care provider for more information. Do not breast-feed an infant while taking this medicine.   Birth control may not work properly while you are taking this medicine. Talk to your health care provider about using an extra method of birth control.   This medicine may increase blood sugar. Ask your health care provider if changes in diet or medicines are needed if you have diabetes.   If you are going to need surgery or other procedure, tell your health care provider that you are using this medicine.   Taking this medicine is only part of a total heart healthy program. Your health care provider may give you a special diet to follow. Avoid alcohol. Avoid smoking. Ask your health care provider how much you should exercise.   What side effects may I notice from receiving this medicine?   Side effects that you should report to your doctor or health care provider as soon as possible:       •  allergic reactions (skin rash, itching or hives; swelling of the face, lips, or tongue)     •  high blood sugar (increased hunger, thirst or urination; unusually weak or tired, blurry vision)     •  infection (fever, chills, cough, sore throat, pain or trouble passing urine)     •  joint pain     •  liver injury (dark yellow or brown urine; general ill feeling or flu-like symptoms; loss of appetite, right upper belly pain; unusually weak or tired, yellowing of the eyes or skin)     •  muscle injury (dark urine; trouble passing urine or change in the  amount of urine; unusually weak or tired; muscle pain; back pain)     •  redness, blistering, peeling, or loosening of the skin, including inside the mouth     Side effects that usually do not require medical attention (report to your doctor or health care provider if they continue or are bothersome):       •  diarrhea     •  nausea     •  upset stomach     This list may not describe all possible side effects. Call your doctor for medical advice about side effects. You may report side effects to FDA at 0-382-JVB-6068.   Where should I keep my medicine?   Keep out of the reach of children and pets.   Store at room temperature between 20 and 25 degrees C (68 and 77 degrees F). Get rid of any unused medicine after the expiration date.   To get rid of medicines that are no longer needed or have :       •  Take the medicine to a medicine take-back program. Check with your pharmacy or law enforcement to find a location.     •  If you cannot return the medicine, check the label or package insert to see if the medicine should be thrown out in the garbage or flushed down the toilet. If you are not sure, ask your health care provider. If it is safe to put it in the trash, take the medicine out of the container. Mix the medicine with cat litter, dirt, coffee grounds, or other unwanted substance. Seal the mixture in a bag or container. Put it in the trash.     NOTE: This sheet is a summary. It may not cover all possible information. If you have questions about this medicine, talk to your doctor, pharmacist, or health care provider.     ©  [employer]vier/Gold Standard (2021 12:41:57)         Heart Attack     A heart attack occurs when blood and oxygen supply to the heart is cut off. A heart attack causes damage to the heart that cannot be fixed. A heart attack is also called a myocardial infarction, or MI. If you think you are having a heart attack, do not wait to see if the symptoms will go away. Get medical help  right away.   What are the causes?       This condition may be caused by:     •  A fatty substance (plaque) in the blood vessels (arteries). This can block the flow of blood to the heart.     •  A blood clot in the blood vessels that go to the heart. The blood clot blocks blood flow.     •  Low blood pressure.     •  An abnormal heartbeat.     •  Some diseases, such as problems in red blood cells (anemia)orproblems in breathing (respiratory failure).     •  Tightening (spasm) of a blood vessel that cuts off blood to the heart.     •  A tear in a blood vessel of the heart.     •  High blood pressure.       What increases the risk?    The following factors may make you more likely to develop this condition:     •  Aging. The older you are, the higher your risk.     •  Having a personal or family history of chest pain, heart attack, stroke, or narrowing of the arteries in the legs, arms, head, or stomach (peripheral artery disease).     •  Being male.     •  Smoking.     •  Not getting regular exercise.     •  Being overweight or obese.     •  Having high blood pressure.     •  Having high cholesterol.     •  Having diabetes.     •  Drinking too much alcohol.     •  Using illegal drugs, such as cocaine or methamphetamine.     What are the signs or symptoms?    Symptoms of this condition include:     • Chest pain. It may feel like:     •  Crushing or squeezing.     •  Tightness, pressure, fullness, or heaviness.     •  Pain in the arm, neck, jaw, back, or upper body.     •  Shortness of breath.     •  Heartburn.     •  Upset stomach (indigestion).     •  Feeling like you may vomit (nauseous).     •  Cold sweats.     •  Feeling tired.     •  Sudden light-headedness.     How is this treated?       A heart attack must be treated as soon as possible. Treatment may include:     • Medicines to:     •  Break up or dissolve blood clots.     •  Thin blood and help prevent blood clots.     •  Treat blood pressure.     •  Improve  blood flow to the heart.     •  Reduce pain.     •  Reduce cholesterol.     •  Procedures to widen a blocked artery and keep it open.     •  Open heart surgery.     •  Receiving oxygen.     •  Making your heart strong again (cardiac rehabilitation) through exercise, education, and counseling.       Follow these instructions at home:   Medicines       • Take over-the-counter and prescription medicines only as told by your doctor. You may need to take medicine:     •  To keep your blood from clotting too easily.     •  To control blood pressure.     •  To lower cholesterol.     •  To control heart rhythms.     • Do not  take these medicines unless your doctor says it is okay:     •  NSAIDs, such as ibuprofen.     •  Supplements that have vitamin A, vitamin E, or both.     •  Hormone replacement therapy that has estrogen with or without progestin.     Lifestyle             • Do not  use any products that have nicotine or tobacco, such as cigarettes, e-cigarettes, and chewing tobacco. If you need help quitting, ask your doctor.     •  Avoid secondhand smoke.     •  Exercise regularly. Ask your doctor about a cardiac rehab program.     •  Eat heart-healthy foods. Your doctor will tell you what foods to eat.     •  Stay at a healthy weight.     •  Lower your stress level.     • Do not  use illegal drugs.       Alcohol use       • Do not  drink alcohol if:     •  Your doctor tells you not to drink.     •  You are pregnant, may be pregnant, or are planning to become pregnant.     • If you drink alcohol:     • Limit how much you use to:     •  0-1 drink a day for women.     •  0-2 drinks a day for men.     •  Know how much alcohol is in your drink. In the [place] one drink equals one 12 oz bottle of beer (355 mL), one 5 oz glass of wine (148 mL), or one 1½ oz glass of hard liquor (44 mL).     General instructions       •  Work with your doctor to treat other problems you may have, such as diabetes or high blood pressure.     •   Get screened for depression. Get treatment if needed.     •  Keep your vaccines up to date. Get the flu shot (influenza vaccine) every year.     •  Keep all follow-up visits as told by your doctor. This is important.       Contact a doctor if:       •  You feel very sad.     •  You have trouble doing your daily activities.     Get help right away if:       •  You have sudden, unexplained discomfort in your chest, arms, back, neck, jaw, or upper body.     •  You have shortness of breath.     •  You have sudden sweating or clammy skin.     •  You feel like you may vomit.     •  You vomit.     •  You feel tired or weak.     •  You get light-headed or dizzy.     •  You feel your heart beating fast.     •  You feel your heart skipping beats.     •  You have blood pressure that is higher than 180/120.     These symptoms may be an emergency. Do not wait to see if the symptoms will go away. Get medical help right away. Call your local emergency services (911 in the [place] Do not drive yourself to the hospital.   Summary       •  A heart attack occurs when blood and oxygen supply to the heart is cut off.     • Do not  take NSAIDs unless your doctor says it is okay.     • Do not  smoke. Avoid secondhand smoke.     •  Exercise regularly. Ask your doctor about a cardiac rehab program.     This information is not intended to replace advice given to you by your health care provider. Make sure you discuss any questions you have with your health care provider.     Document Released: 06/18/2013Document Revised: 03/30/2020Document Reviewed: 03/30/2020     Elsevier Patient Education © 2021 [employer]         Heart-Healthy Eating Plan     Many factors influence your heart (coronary) health, including eating and exercise habits. Coronary risk increases with abnormal blood fat (lipid) levels. Heart-healthy meal planning includes limiting unhealthy fats, increasing healthy fats, and making other diet and lifestyle changes.   What is  my plan?    Your health care provider may recommend that you:     •  Limit your fat intake to _________% or less of your total calories each day.     •  Limit your saturated fat intake to _________% or less of your total calories each day.     •  Limit the amount of cholesterol in your diet to less than _________ mg per day.     What are tips for following this plan?   Cooking    Cook foods using methods other than frying. Baking, boiling, grilling, and broiling are all good options. Other ways to reduce fat include:     •  Removing the skin from poultry.     •  Removing all visible fats from meats.     •  Steaming vegetables in water or broth.     Meal planning          • At meals, imagine dividing your plate into fourths:     •  Fill one-half of your plate with vegetables and green salads.     •  Fill one-fourth of your plate with whole grains.     •  Fill one-fourth of your plate with lean protein foods.     •  Eat 4-5 servings of vegetables per day. One serving equals 1 cup raw or cooked vegetable, or 2 cups raw leafy greens.     •  Eat 4-5 servings of fruit per day. One serving equals 1 medium whole fruit, ¼ cup dried fruit, ½ cup fresh, frozen, or canned fruit, or ½ cup 100% fruit juice.     •  Eat more foods that contain soluble fiber. Examples include apples, broccoli, carrots, beans, peas, and barley. Aim to get 25-30 g of fiber per day.     •  Increase your consumption of legumes, nuts, and seeds to 4-5 servings per week. One serving of dried beans or legumes equals ½ cup cooked, 1 serving of nuts is ¼ cup, and 1 serving of seeds equals 1 tablespoon.     Fats       •  Choose healthy fats more often. Choose monounsaturated and polyunsaturated fats, such as olive and canola oils, flaxseeds, walnuts, almonds, and seeds.     •  Eat more omega-3 fats. Choose salmon, mackerel, sardines, tuna, flaxseed oil, and ground flaxseeds. Aim to eat fish at least 2 times each week.     •  Check food labels carefully to  identify foods with trans fats or high amounts of saturated fat.     •  Limit saturated fats. These are found in animal products, such as meats, butter, and cream. Plant sources of saturated fats include palm oil, palm kernel oil, and coconut oil.     •  Avoid foods with partially hydrogenated oils in them. These contain trans fats. Examples are stick margarine, some tub margarines, cookies, crackers, and other baked goods.     •  Avoid fried foods.     General information       •  Eat more home-cooked food and less restaurant, buffet, and fast food.     •  Limit or avoid alcohol.     •  Limit foods that are high in starch and sugar.     •  Lose weight if you are overweight. Losing just 5-10% of your body weight can help your overall health and prevent diseases such as diabetes and heart disease.     •  Monitor your salt (sodium) intake, especially if you have high blood pressure. Talk with your health care provider about your sodium intake.     •  Try to incorporate more vegetarian meals weekly.       What foods can I eat?   Fruits   All fresh, canned (in natural juice), or frozen fruits.   Vegetables   Fresh or frozen vegetables (raw, steamed, roasted, or grilled). Green salads.   Grains   Most grains. Choose whole wheat and whole grains most of the time. Rice and pasta, including brown rice and pastas made with whole wheat.   Meats and other proteins   Lean, well-trimmed beef, veal, pork, and lamb. Chicken and turkey without skin. All fish and shellfish. Wild duck, rabbit, pheasant, and venison. Egg whites or low-cholesterol egg substitutes. Dried beans, peas, lentils, and tofu. Seeds and most nuts.   Dairy   Low-fat or nonfat cheeses, including ricotta and mozzarella. Skim or 1% milk (liquid, powdered, or evaporated). Buttermilk made with low-fat milk. Nonfat or low-fat yogurt.   Fats and oils   Non-hydrogenated (trans-free) margarines. Vegetable oils, including soybean, sesame, sunflower, olive, peanut,  safflower, corn, canola, and cottonseed. Salad dressings or mayonnaise made with a vegetable oil.   Beverages   Water (mineral or sparkling). Coffee and tea. Diet carbonated beverages.   Sweets and desserts   Sherbet, gelatin, and fruit ice. Small amounts of dark chocolate.   Limit all sweets and desserts.   Seasonings and condiments   All seasonings and condiments.   The items listed above may not be a complete list of foods and beverages you can eat. Contact a dietitian for more options.   What foods are not recommended?   Fruits   Canned fruit in heavy syrup. Fruit in cream or butter sauce. Fried fruit. Limit coconut.   Vegetables   Vegetables cooked in cheese, cream, or butter sauce. Fried vegetables.   Grains   Breads made with saturated or trans fats, oils, or whole milk. Croissants. Sweet rolls. Donuts. High-fat crackers, such as cheese crackers.   Meats and other proteins   Fatty meats, such as hot dogs, ribs, sausage, acuna, rib-eye roast or steak. High-fat deli meats, such as salami and bologna. Caviar. Domestic duck and goose. Organ meats, such as liver.   Dairy   Cream, sour cream, cream cheese, and creamed cottage cheese. Whole milk cheeses. Whole or 2% milk (liquid, evaporated, or condensed). Whole buttermilk. Cream sauce or high-fat cheese sauce. Whole-milk yogurt.   Fats and oils   Meat fat, or shortening. Cocoa butter, hydrogenated oils, palm oil, coconut oil, palm kernel oil. Solid fats and shortenings, including acuna fat, salt pork, lard, and butter. Nondairy cream substitutes. Salad dressings with cheese or sour cream.   Beverages   Regular sodas and any drinks with added sugar.   Sweets and desserts   Frosting. Pudding. Cookies. Cakes. Pies. Milk chocolate or white chocolate. Buttered syrups. Full-fat ice cream or ice cream drinks.   The items listed above may not be a complete list of foods and beverages to avoid. Contact a dietitian for more information.   Summary       •  Heart-healthy meal  planning includes limiting unhealthy fats, increasing healthy fats, and making other diet and lifestyle changes.     •  Lose weight if you are overweight. Losing just 5-10% of your body weight can help your overall health and prevent diseases such as diabetes and heart disease.     •  Focus on eating a balance of foods, including fruits and vegetables, low-fat or nonfat dairy, lean protein, nuts and legumes, whole grains, and heart-healthy oils and fats.     This information is not intended to replace advice given to you by your health care provider. Make sure you discuss any questions you have with your health care provider.     Document Released: 09/26/2009Document Revised: 01/25/2019Document Reviewed: 01/25/2019     [employer] Patient Education © 2021 [employer]         Metoprolol Tablets     What is this medicine?   METOPROLOL (me TOE proe lole) is a beta blocker. It decreases the amount of work your heart has to do and helps your heart beat regularly. It is used to treat high blood pressure and/or prevent chest pain (also called angina). It is also used after a heart attack to prevent a second one.   This medicine may be used for other purposes; ask your health care provider or pharmacist if you have questions.   COMMON BRAND NAME(S): Lopressor   What should I tell my health care provider before I take this medicine?   They need to know if you have any of these conditions:       •  diabetes     •  heart or vessel disease like slow heart rate, worsening heart failure, heart block, sick sinus syndrome or Raynaud's disease     •  kidney disease     •  liver disease     •  lung or breathing disease, like asthma or emphysema     •  pheochromocytoma     •  thyroid disease     •  an unusual or allergic reaction to metoprolol, other beta-blockers, medicines, foods, dyes, or preservatives     •  pregnant or trying to get pregnant     •  breast-feeding     How should I use this medicine?   Take this drug by mouth with  water. Take it as directed on the prescription label at the same time every day. You can take it with or without food. You should always take it the same way. Keep taking it unless your health care provider tells you to stop.   Talk to your health care provider about the use of this drug in children. Special care may be needed.   Overdosage: If you think you have taken too much of this medicine contact a poison control center or emergency room at once.   NOTE: This medicine is only for you. Do not share this medicine with others.   What if I miss a dose?   If you miss a dose, take it as soon as you can. If it is almost time for your next dose, take only that dose. Do not take double or extra doses.   What may interact with this medicine?   This medicine may interact with the following medications:       •  certain medicines for blood pressure, heart disease, irregular heart beat     •  certain medicines for depression like monoamine oxidase (MAO) inhibitors, fluoxetine, or paroxetine     •  clonidine     •  dobutamine     •  epinephrine     •  isoproterenol     •  reserpine     This list may not describe all possible interactions. Give your health care provider a list of all the medicines, herbs, non-prescription drugs, or dietary supplements you use. Also tell them if you smoke, drink alcohol, or use illegal drugs. Some items may interact with your medicine.   What should I watch for while using this medicine?   Visit your doctor or health care professional for regular check ups. Contact your doctor right away if your symptoms worsen. Check your blood pressure and pulse rate regularly. Ask your health care professional what your blood pressure and pulse rate should be, and when you should contact them.   You may get drowsy or dizzy. Do not drive, use machinery, or do anything that needs mental alertness until you know how this medicine affects you. Do not sit or stand up quickly, especially if you are an older  patient. This reduces the risk of dizzy or fainting spells. Contact your doctor if these symptoms continue. Alcohol may interfere with the effect of this medicine. Avoid alcoholic drinks.   This medicine may increase blood sugar. Ask your healthcare provider if changes in diet or medicines are needed if you have diabetes.   What side effects may I notice from receiving this medicine?   Side effects that you should report to your doctor or health care professional as soon as possible:       •  allergic reactions like skin rash, itching or hives     •  cold or numb hands or feet     •  depression     •  difficulty breathing     •  faint     •  fever with sore throat     •  irregular heartbeat, chest pain     •  rapid weight gain     •  signs and symptoms of high blood sugar such as being more thirsty or hungry or having to urinate more than normal. You may also feel very tired or have blurry vision.     •  swollen legs or ankles     Side effects that usually do not require medical attention (report to your doctor or health care professional if they continue or are bothersome):       •  anxiety or nervousness     •  change in sex drive or performance     •  dry skin     •  headache     •  nightmares or trouble sleeping     •  short term memory loss     •  stomach upset or diarrhea     This list may not describe all possible side effects. Call your doctor for medical advice about side effects. You may report side effects to FDA at 0-924-FDA-3687.   Where should I keep my medicine?   Keep out of the reach of children and pets.   Store at room temperature between 15 and 30 degrees C (59 and 86 degrees F). Protect from moisture. Keep the container tightly closed. Throw away any unused drug after the expiration date.   NOTE: This sheet is a summary. It may not cover all possible information. If you have questions about this medicine, talk to your doctor, pharmacist, or health care provider.     © 2021 Elsevier/Gold Standard  (2020-07-30 17:21:17)         Ticagrelor oral tablet     What is this medicine?   TICAGRELOR (NEMESIO ka GREL or) helps to prevent blood clots. It lowers the risk of heart attacks or stroke in people with coronary artery disease (such as chest pain or heart attack) or a previous stroke or mini-stroke. It is in a class of drugs called anti-platelets.   This medicine may be used for other purposes; ask your health care provider or pharmacist if you have questions.   COMMON BRAND NAME(S): LESLIE   What should I tell my health care provider before I take this medicine?   They need to know if you have any of these conditions:       •  bleeding disorders     •  bleeding in the brain     •  colon polyps     •  having surgery     •  history of irregular heartbeat or rhythm     •  history of stomach bleeding     •  liver disease     •  lung or breathing disease (asthma, COPD)     •  severe injury     •  sleep apnea     •  stomach ulcer     •  stroke or transient ischemic attack     •  take medicines that treat or prevent blood clots     •  an unusual or allergic reaction to ticagrelor, other medicines, foods, dyes, or preservatives     •  pregnant or trying to get pregnant     •  breast-feeding     How should I use this medicine?   Take this medicine by mouth. Swallow the tablets whole. You may also crush the tablets. After crushing the tablets, put the contents in a glass of water. Swallow the medicine and water right away. Refill the glass with water, stir, and drink all of the water. You can take it with or without food. If it upsets your stomach, take it with food. For your therapy to work as well as possible, take each dose exactly as prescribed on the prescription label. Do not skip doses. Skipping doses or stopping this medicine can increase your risk of a blood clot or stroke. Keep taking this medicine unless your health care provider tells you to stop.   A special MedGuide will be given to you by the pharmacist with  each prescription and refill. Be sure to read this information carefully each time.   Talk to your health care provider about the use of this medicine in children. Special care may be needed.   Overdosage: If you think you have taken too much of this medicine contact a poison control center or emergency room at once.   NOTE: This medicine is only for you. Do not share this medicine with others.   What if I miss a dose?   If you miss a dose, take it as soon as you can. If it is almost time for your next dose, take only that dose. Do not take double or extra doses.   What may interact with this medicine?   Do not take this medicine with any of the following medications:       •  avanafil     •  defibrotide     •  itraconazole     This medicine may also interact with the following medications:       •  aspirin     •  certain antibiotics like clarithromycin, telithromycin, and rifampin     •  certain antiviral medicines for HIV or AIDS like atazanavir, indinavir, nelfinavir, ritonavir, and saquinavir     •  certain medicines for cholesterol like lovastatin and simvastatin     •  certain medicines for fungal infections like itraconazole, ketoconazole and voriconazole     •  certain medicines for seizures like carbamazepine, phenobarbital, and phenytoin     •  digoxin     •  narcotic medicines for pain     •  nefazodone     This list may not describe all possible interactions. Give your health care provider a list of all the medicines, herbs, non-prescription drugs, or dietary supplements you use. Also tell them if you smoke, drink alcohol, or use illegal drugs. Some items may interact with your medicine.   What should I watch for while using this medicine?   Visit your health care provider for regular checks on your progress. Your condition will be monitored carefully while you are receiving this medicine. It is important not to miss any appointments.   This medicine may increase your risk to bruise or bleed. Call your  health care provider if you notice any unusual bleeding.   Avoid sports or activities that might cause injury while you are using this medicine. Severe falls or injuries can cause unseen bleeding. Be careful when using sharp tools or knives. Consider using an electric razor. Take special care brushing or flossing your teeth. Report any injuries, bruising, or red spots on the skin to your health care provider.   If you are going to have surgery or other procedure, tell your health care provider that you are taking this medicine.   Tell your dentist and dental surgeon that you are taking this medicine. You should not have major dental surgery while on this medicine. See your dentist to have a dental exam and fix any dental problems before starting this medicine. Take good care of your teeth while on this medicine. Make sure to see your dentist for regular cleanings.   You should take aspirin every day with this medicine. Do not take more than 100 mg of aspirin per day.   Wear a medical ID bracelet or chain. Carry a card that describes your condition. List the medicines and doses you take on the card.   What side effects may I notice from receiving this medicine?   Side effects that you should report to your doctor or health care professional as soon as possible:       •  allergic reactions (skin rash, itching or hives; swelling of the face, lips, or tongue)     •  bleeding (bloody or black, tarry stools; red or dark brown urine; spitting up blood or brown material that looks like coffee ground; red spots on the skin; unusual bruising or bleeding from the eyes, gums, or nose)     •  heartbeat rhythm changes (trouble breathing; chest pain; dizziness; irregular heartbeat; feeling faint or lightheaded, falls)     •  trouble breathing, especially during sleep (breathing faster, then slower; short pauses in breathing; loud snoring)     Side effects that usually do not require medical attention (report to your doctor or  health care professional if they continue or are bothersome):       •  diarrhea     •  headache     •  nausea     •  tiredness     This list may not describe all possible side effects. Call your doctor for medical advice about side effects. You may report side effects to FDA at 1-429-BXC-7371.   Where should I keep my medicine?   Keep out of the reach of children and pets.   Store at room temperature between 20 and 25 degrees C (68 and 77 degrees F). Get rid of any unused medicine after the expiration date.   To get rid of medicines that are no longer needed or have :       •  Take the medicine to a medicine take-back program. Check with your pharmacy or law enforcement to find a location.     •  If you cannot return the medicine, check the label or package insert to see if the medicine should be thrown out in the garbage or flushed down the toilet. If you are not sure, ask your health care provider. If it is safe to put it in the trash, empty the medicine out of the container. Mix the medicine with cat litter, dirt, coffee grounds, or other unwanted substance. Seal the mixture in a bag or container. Put it in the trash.     NOTE: This sheet is a summary. It may not cover all possible information. If you have questions about this medicine, talk to your doctor, pharmacist, or health care provider.     ©  Elsevier/Gold Standard (2021 23:55:35)         Electronically signed by Interface, Transcription Incoming at 22 1243     Dorothy Moody PA at 22 0923     Attestation signed by Jean Cooper IV, MD at 22 0942    I have reviewed this documentation and agree.    H. C. Ignacio Cooper MD [facility], [facility]  Interventional and General Cardiology                     [facility] - Progress Note    Ritesh Sae  1965  S460/1    22, 08:43 EST      Chief Complaint: Following for CAD.  S/P Inferior MI with PCI of RCA    Subjective:   Patient  "moved out of ICU to tele yesterday.  Slept much better last night.  Denies chest pain, denies dyspnea.  Ready to go home.  Sister will be coming to get him around Noon.      Review of Systems:  Pertinent positives are listed above and in physical exam.  All others have been reviewed and are negative.    aspirin, 81 mg, Oral, Daily  atorvastatin, 80 mg, Oral, Nightly  metoprolol tartrate, 25 mg, Oral, Q12H  pharmacy consult - MTM, , Does not apply, Daily  Pharmacy Meds to Bed Consult, , Does not apply, Daily  senna-docusate sodium, 2 tablet, Oral, BID  sodium chloride, 10 mL, Intravenous, Q12H  ticagrelor, 90 mg, Oral, BID  valsartan, 40 mg, Oral, Daily        Objective:  Vitals:   height is 187.2 cm (73.7\") and weight is 101 kg (222 lb 10.6 oz). His oral temperature is 98.1 °F (36.7 °C). His blood pressure is 113/63 and his pulse is 63. His respiration is 18 and oxygen saturation is 94%.       Intake/Output Summary (Last 24 hours) at 3/5/2022 0923  Last data filed at 3/4/2022 1400  Gross per 24 hour   Intake --   Output 800 ml   Net -800 ml       Physical Exam:  General:  WN, NAD, A and O x3.  CV:  Normal S1,S2. No murmur, rub, or gallop.  Resp:  CTA Chris, equal, nonlabored.  Abd:  Soft, + BS, no organomegaly. Nontender to palpation.  Extrem:  No edema BLE, 2+ pedal/PT pulses.            Results from last 7 days   Lab Units 03/05/22  0533   WBC 10*3/mm3 14.45*   HEMOGLOBIN g/dL 14.1   HEMATOCRIT % 42.2   PLATELETS 10*3/mm3 207     Results from last 7 days   Lab Units 03/05/22  0533   SODIUM mmol/L 138   POTASSIUM mmol/L 3.9   CHLORIDE mmol/L 102   CO2 mmol/L 24.0   BUN mg/dL 13   CREATININE mg/dL 1.13   CALCIUM mg/dL 8.9   GLUCOSE mg/dL 105*         Results from last 7 days   Lab Units 03/04/22  0425   TSH uIU/mL 0.767     Results from last 7 days   Lab Units 03/03/22  1205   CHOLESTEROL mg/dL 259*   TRIGLYCERIDES mg/dL 114   HDL CHOL mg/dL 43   LDL CHOL mg/dL 195*           Tele:  NSR    Assessment and Plan:  1.  " Inferior MI   -  57 yo CM presents with chest pain, inferior MI by EKG.  S/P successful PCI of distal RCA and RI. EF 50%.   -  Continue DAPT with low dose ASA, Brilinta for a year.  Continue high intensity statin, beta blocker, ARB.   -  Will DC home today in stable condition.  Per patient request, he would like to follow up with one of our physicians in [facility]  - have arranged 1 month follow up with EKG, Dr. Soto.   -  Cardiac Rehab consulted with patient.    -  Gave pt our office number/RN info to call with work related paperwork.      2.  Hyperlipidemia   -  Lipid panel personally reviewed.   -  Continue high intensity statin and appropriate diet.    3. Tobacco Abuse   -  Cessation education to be provided.   -  At risk for readmission      I discussed the patient's findings and my recommendations with the patient, any present family members, and the nursing staff.       IRAM Goldsmith-C  03/05/22, 09:25 EST    Electronically signed by IRAM Goldsmith, 03/05/22, 9:31 AM EST.                    Electronically signed by Jean Cooper IV, MD at 03/07/22 0942     Zafar Dukes MD at 03/04/22 1541              INTENSIVIST   HOSPITAL VISIT NOTE     Hospital:  LOS: 1 day        S     Ritesh, 56 y.o. male is followed for:No chief complaint on file.       STEMI (ST elevation myocardial infarction) (HCC)    Hyperlipidemia LDL goal <70    Tobacco abuse    As an Intensivist, we provide an integrated approach to the ICU patient and family, medical management of comorbid conditions, including but not limited to electrolytes, glycemic control, organ dysfunction, lead interdisciplinary rounds and coordinate the care with all other services, including those from other specialists.     Interval History:  POD: 1 Day Post-Op ([facility])    Doing well.  No chest pain.  He wants to go home.    The patient has started, but not completed, their COVID-19 vaccination series.        The patient's relevant past  medical, surgical and social history were reviewed and updated in Epic as appropriate.      History     Last Reviewed by Zafar Dukes MD on 3/3/2022 at  4:59 PM    Sections Reviewed    Medical, Family, Surgical, Tobacco, Alcohol, Drug Use, Sexual Activity,   Social Documentation      Problem list reviewed by Zafar Dukes MD on 3/3/2022 at  4:59 PM  Medicines reviewed by Zafar Dukes MD on 3/3/2022 at  4:59 PM  Allergies reviewed by Zafar Dukes MD on 3/3/2022 at  4:59 PM      O     Vitals:  Temp: 98.6 °F (37 °C) (03/04/22 1442) Temp  Min: 98.1 °F (36.7 °C)  Max: 98.6 °F (37 °C)   Temp core:      BP: 124/80 (03/04/22 1442) BP  Min: 95/73  Max: 134/89   Pulse: 86 (03/04/22 1500) Pulse  Min: 57  Max: 91   Resp: 16 (03/04/22 1442) Resp  Min: 12  Max: 18   SpO2: 94 % (03/04/22 1500) SpO2  Min: 90 %  Max: 96 %   Device: room air (03/04/22 1445)    Flow Rate: 2 (03/03/22 1615) Flow (L/min)  Min: 2  Max: 2     Intake/Ouptut 24 hrs (7:00AM - 6:59 AM)    Intake/Output Summary (Last 24 hours) at 3/4/2022 1541  Last data filed at 3/4/2022 1300  Gross per 24 hour   Intake 840 ml   Output 2550 ml   Net -1710 ml        Physical Examination    Telemetry:  Rhythm: normal sinus rhythm (03/04/22 1445)         Constitutional:  No acute distress.   Head: Normocephalic.   ENMT: No oral lesions.   Neck: No adenopathy. Supple.  Trachea midline.   Cardiovascular: RRR.   Normal heart sounds.  No murmurs, gallop or rub.   Respiratory: Normal breath sounds  No adventitious sounds.   Abdominal:  Soft with no tenderness.  No distension.   No HSM.   Extremities: Warm.  Dry.  No cyanosis.  No Edema   Neurological/Psych:   Alert, Oriented, Cooperative.  Best Eye Response: 4-->(E4) spontaneous (03/04/22 1445)  Best Motor Response: 6-->(M6) obeys commands (03/04/22 1445)  Best Verbal Response: 5-->(V5) oriented (03/04/22 1445)  Josey Coma Scale Score: 15 (03/04/22 1445)     Results  Reviewed:  Laboratory  Microbiology  Radiology  Pathology    Hematology:  Results from last 7 days   Lab Units 03/03/22  1442   WBC 10*3/mm3 16.41*   HEMOGLOBIN g/dL 14.3   PLATELETS 10*3/mm3 244   NEUTROS ABS 10*3/mm3 13.29*   LYMPHS ABS 10*3/mm3 1.93       Chemistry:  Estimated Creatinine Clearance: 105.9 mL/min (by C-G formula based on SCr of 0.96 mg/dL).  Results from last 7 days   Lab Units 03/04/22  0425 03/03/22  0843   SODIUM mmol/L 135*  --    POTASSIUM mmol/L 4.2  --    CHLORIDE mmol/L 98  --    CO2 mmol/L 26.0  --    BUN mg/dL 10  --    CREATININE mg/dL 0.96 0.60   GLUCOSE mg/dL 109*  --      Results from last 7 days   Lab Units 03/04/22  0425 03/03/22  1205   CALCIUM mg/dL 9.3  --    MAGNESIUM mg/dL 2.4 1.8     Cardiac Labs:  Results from last 7 days   Lab Units 03/03/22  1205   TROPONIN T ng/mL 6.120*     COVID-19  Lab Results   Lab Value Date/Time    COVID19 Presumptive Negative 03/03/2022 0857     Images:  No radiology results for the last day    Echo:  Results for orders placed during the hospital encounter of 03/03/22    Adult Transthoracic Echo Complete W/ Cont if Necessary Per Protocol    Interpretation Summary  · Left ventricular systolic function is normal. Estimated left ventricular EF = 60%.  · The following left ventricular wall segments are hypokinetic: mid inferior and basal inferior.  · The cardiac valves are anatomically and functionally normal.    Results: Reviewed.  I reviewed the patient's new laboratory and imaging results.  I independently reviewed the patient's new images.    Medications: Reviewed.    Assessment/Plan   A / P     Ritesh, is a 56 y.o. male admitted on 3/3/2022 with ST elevation myocardial infarction involving right coronary artery (HCC) [I21.11]:     1. STEMI  1. [facility] 03/03/22: Severe 2-vessel CAD (RCA, ramus).    1. Successful PCI of proximal to distal RCA and RPL using overlapping Xience KYUNG.    2. Successful PCI of ramus branch with Xience 3.5 x 23 mm KYUNG  2. HTN on  ACEI  3. Dyslipidemia on statins    Lab Results   Lab Value Date/Time    CHOL 259 (H) 2022 1205    HDL 43 2022 1205     (H) 2022 1205    TRIG 114 2022 1205      4. Tobacco use  5. At risk for DM (pre-diabetes) based on his HbA1c. [HbA1C 5.7%-6.4%, eA-139 mg/dL].         Lab Results   Lab Value Date/Time    HGBA1C 5.70 (H) 2022 1442       Advance Directives: Code Status and Medical Interventions:   Ordered at: 22 1008     Code Status (Patient has no pulse and is not breathing):    CPR (Attempt to Resuscitate)     Medical Interventions (Patient has pulse or is breathing):    Full Support        Plan:    1. Tobacco Cessation  2. Disposition: Transfer to Telemetry Unit } Home tomorrow as per Cardiology.    Plan of care and goals reviewed during interdisciplinary rounds.  I discussed the patient's findings and my recommendations with patient    Level of Risk is High due to:  illness with threat to life or bodily function.     We'll follow prn once on the floor. Please call us with question or if his condition changes.    Time: 15 minutes, in direct patient care, with the patient and/or in the ICU or loomis coordinating care with other health care providers.     I have spent > 50% percent of this time, counseling and discussing management.     []  Primary Attending  [x]  Consultant      Electronically signed by Zafar Dukes MD at 22 1607     Kavitha Charles, RN at 22 1436          Ref # [id number]  Kavitha Charles RN, BSN, [facility]  Phone # [phone]  Fax # [phone]  Ritesh Quevedo (56 y.o. Male)             Date of Birth Social Security Number Address Home Phone MRN    1965  [id number] [address] [phone] [id number]    [employer] Marital Status             None Single       Admission Date Admission Type Admitting Provider Attending Provider Department, Room/Bed    3/3/22 Elective Jean Cooper IV, MD  "Jean Cooper IV, MD [facility] 2B ICU, N232/1    Discharge Date Discharge Disposition Discharge Destination                         Attending Provider: Jean Cooper IV, MD    Allergies: No Known Allergies    Isolation: None   Infection: None   Code Status: CPR   Advance Care Planning Activity    Ht: 187.2 cm (73.7\")   Wt: 101 kg (222 lb 10.6 oz)    Admission Cmt: None   Principal Problem: STEMI (ST elevation myocardial infarction) (HCC) [I21.3] More...                 Active Insurance as of 3/3/2022     Primary Coverage     Payor Plan Insurance Group Employer/Plan Group    [employer] [id number]     Payor Plan Address Payor Plan Phone Number Payor Plan Fax Number Effective Dates    [address] [phone]  10/1/2021 - None Entered    [place] [id number]       Subscriber Name Subscriber Birth Date Member ID       LOAN RAMIREZ 1965 [id number]                    History & Physical      Jean Cooper IV, MD at 03/03/22 0716          Cardiology [facility]      Reason for consultation:  · CODE AMI    Requesting provider: [facility] ER    IDENTIFICATION: 56 year old from [facility] Problems    Diagnosis  POA   • **STEMI (ST elevation myocardial infarction) (HCC) [I21.3]  Yes     Priority: High   • Hyperlipidemia LDL goal <70 [E78.5]  Unknown              56-year-old gentleman with no prior cardiac history presented to [facility] ER with 24 hours of substernal chest discomfort.  Discomfort was most notable in his throat and upper chest.  He developed nausea and vomiting.  He presented to [facility] ER with inferior ST elevation.  Code AMI was activated.  The patient was flown directly to the cardiac catheterization lab at [facility] with ongoing pain, nausea, vomiting but hemodynamically stable.    Cardiac catheterization was performed via the right radial approach.  He was found to have occlusion of " the distal RCA which was treated with multiple overlapping drug-eluting stents from proximal to distal vessel.  Additionally, he had a high-grade stenosis of a large ramus intermediate branch.  This was treated with a drug-eluting stent as well.  LVEF 50% with inferior akinesis.        Not on File    Prior to Admission medications    Not on File       History reviewed. No pertinent past medical history.    History reviewed. No pertinent surgical history.    History reviewed. No pertinent family history.    Social History     Tobacco Use   Smoking Status Not on file   Smokeless Tobacco Not on file       Social History     Substance and Sexual Activity   Alcohol Use None         Review of Systems:   Review of Systems   Unable to perform ROS: acuity of condition   Cardiovascular: Positive for chest pain.   Gastrointestinal: Positive for nausea and vomiting.            Vital Sign Min/Max for last 24 hours  No data recorded   BP  Min: 140/86  Max: 140/86   Pulse  Min: 75  Max: 75   Resp  Min: 20  Max: 20   SpO2  Min: 98 %  Max: 98 %   Flow (L/min)  Min: 3  Max: 3    No intake or output data in the 24 hours ending 03/03/22 0958          Constitutional:       Appearance: Healthy appearance. Obese.   Eyes:      General: Lids are normal. No scleral icterus.     Conjunctiva/sclera: Conjunctivae normal.   HENT:      Head: Normocephalic and atraumatic.   Neck:      Thyroid: No thyromegaly.      Vascular: No carotid bruit or JVD.   Pulmonary:      Effort: Pulmonary effort is normal.      Breath sounds: Normal breath sounds. No wheezing. No rhonchi. No rales.   Cardiovascular:      Normal rate. Regular rhythm.      Murmurs: There is no murmur.      No gallop. No rub.   Pulses:     Intact distal pulses.   Edema:     Peripheral edema absent.   Abdominal:      General: There is no distension.      Palpations: Abdomen is soft. There is no abdominal mass.   Musculoskeletal:      Cervical back: Normal range of motion. Skin:      General: Skin is warm and dry.      Findings: No rash.   Neurological:      General: No focal deficit present.      Mental Status: Alert and oriented to person, place, and time.      Gait: Gait is intact.   Psychiatric:         Attention and Perception: Attention normal.         Mood and Affect: Mood normal.         Behavior: Behavior normal.          Tele: Sinus    DATA REVIEW:    EKG (3/3/2022): Sinus with inferior ST elevation                               Inferior STEMI  · Status post PCI of RCA as well as ramus intermediate branch  · LVEF 50%    Hyperlipidemia goal LDL <70  · We will start atorvastatin 80 mg nightly                 · Admit to ICU  · Metoprolol 25 mg twice daily  · Valsartan 40 mg daily  · Low-dose aspirin and ticagrelor until 3/2023      Electronically signed by Jean Cooper IV, MD, 03/03/22, 7:16 AM EST.        Electronically signed by Jean Cooper IV, MD at 03/03/22 1001       Emergency Department Notes    No notes of this type exist for this encounter.           Current Facility-Administered Medications   Medication Dose Route Frequency Provider Last Rate Last Admin   • acetaminophen (TYLENOL) tablet 650 mg  650 mg Oral Q4H PRN Jean Cooper IV, MD   650 mg at 03/03/22 1224   • aspirin EC tablet 81 mg  81 mg Oral Daily Jean Cooper IV, MD   81 mg at 03/04/22 0837   • atorvastatin (LIPITOR) tablet 80 mg  80 mg Oral Nightly Jean Cooper IV, MD   80 mg at 03/03/22 2127   • sennosides-docusate (PERICOLACE) 8.6-50 MG per tablet 2 tablet  2 tablet Oral BID Jean Cooper IV, MD   2 tablet at 03/04/22 0837    And   • polyethylene glycol (MIRALAX) packet 17 g  17 g Oral Daily PRN Jean Cooper IV, MD        And   • bisacodyl (DULCOLAX) EC tablet 5 mg  5 mg Oral Daily PRN Jean Cooper IV, MD        And   • bisacodyl (DULCOLAX) suppository 10 mg  10 mg Rectal Daily PRN Jean Cooper IV, MD        • HYDROcodone-acetaminophen (NORCO) 7.5-325 MG per tablet 1 tablet  1 tablet Oral Q4H PRN Jean Cooper IV, MD       • Magnesium Sulfate 2 gram Bolus, followed by 8 gram infusion (total Mg dose 10 grams)- Mg less than or equal to 1mg/dL  2 g Intravenous PRN Jean Cooper IV, MD        Or   • Magnesium Sulfate 2 gram / 50mL Infusion (GIVE X 3 BAGS TO EQUAL 6GM TOTAL DOSE) - Mg 1.1 - 1.5 mg/dl  2 g Intravenous PRN Jean Cooper IV, MD        Or   • Magnesium Sulfate 4 gram infusion- Mg 1.6-1.9 mg/dL  4 g Intravenous PRN Jean Cooper IV, MD 25 mL/hr at 03/03/22 1446 4 g at 03/03/22 1446   • metoprolol tartrate (LOPRESSOR) tablet 25 mg  25 mg Oral Q12H Jean Cooper IV, MD   25 mg at 03/04/22 0955   • morphine injection 2 mg  2 mg Intravenous Q4H PRN Jean Cooper IV, MD   2 mg at 03/03/22 0832   • ondansetron (ZOFRAN) tablet 4 mg  4 mg Oral Q6H PRN Jean Cooper IV, MD   4 mg at 03/03/22 1236    Or   • ondansetron (ZOFRAN) injection 4 mg  4 mg Intravenous Q6H PRJean Barbosa IV, MD       • Pharmacy Consult - MTM   Does not apply Daily Samantha Cowan, PharmD       • Pharmacy Meds to Bed Consult   Does not apply Daily Jean Cooper IV, MD       • potassium chloride (KLOR-CON) packet 40 mEq  40 mEq Oral PRN Jean Cooper IV, MD       • potassium chloride (MICRO-K) CR capsule 40 mEq  40 mEq Oral PRN Jean Cooper IV, MD       • sodium chloride 0.9 % flush 10 mL  10 mL Intravenous Q12H Jean Cooper IV, MD   10 mL at 03/03/22 1221   • sodium chloride 0.9 % flush 10 mL  10 mL Intravenous PRN Jean Cooper IV, MD       • ticagrelor (BRILINTA) tablet 90 mg  90 mg Oral BID Jean Cooper IV, MD   90 mg at 03/04/22 0837   • valsartan (DIOVAN) tablet 40 mg  40 mg Oral Daily Jean Cooper IV, MD   40 mg at 03/04/22 0839     Lab Results (last 72  hours)     Procedure Component Value Units Date/Time    Magnesium [[id number]]  (Normal) Collected: 03/04/22 0425    Specimen: Blood Updated: 03/04/22 0557     Magnesium 2.4 mg/dL      Comment: Result checked       Basic Metabolic Panel [[id number]]  (Abnormal) Collected: 03/04/22 0425    Specimen: Blood Updated: 03/04/22 0555     Glucose 109 mg/dL      BUN 10 mg/dL      Creatinine 0.96 mg/dL      Sodium 135 mmol/L      Potassium 4.2 mmol/L      Comment: Slight hemolysis detected by analyzer. Results may be affected.        Chloride 98 mmol/L      CO2 26.0 mmol/L      Calcium 9.3 mg/dL      BUN/Creatinine Ratio 10.4     Anion Gap 11.0 mmol/L      eGFR 91.6 mL/min/1.73      Comment: National Kidney Foundation and American Society of Nephrology (ASN) Task Force recommended calculation based on the Chronic Kidney Disease Epidemiology Collaboration (CKD-EPI) equation refit without adjustment for race.       Narrative:      GFR Normal >60  Chronic Kidney Disease <60  Kidney Failure <15      TSH [[id number]]  (Normal) Collected: 03/04/22 0425    Specimen: Blood Updated: 03/04/22 0522     TSH 0.767 uIU/mL     CBC & Differential [[id number]]  (Abnormal) Collected: 03/03/22 1442    Specimen: Blood Updated: 03/03/22 1939    Narrative:      The following orders were created for panel order CBC & Differential.  Procedure                               Abnormality         Status                     ---------                               -----------         ------                     CBC Auto Differential[[id number]]        Abnormal            Final result                 Please view results for these tests on the individual orders.    CBC Auto Differential [[id number]]  (Abnormal) Collected: 03/03/22 1442    Specimen: Blood Updated: 03/03/22 1939     WBC 16.41 10*3/mm3      RBC 4.78 10*6/mm3      Hemoglobin 14.3 g/dL      Hematocrit 41.1 %      MCV 86.0 fL      MCH 29.9 pg      MCHC 34.8 g/dL      RDW 14.1 %      RDW-SD 44.4 fl       MPV 11.2 fL      Platelets 244 10*3/mm3      Neutrophil % 80.9 %      Lymphocyte % 11.8 %      Monocyte % 6.6 %      Eosinophil % 0.1 %      Basophil % 0.2 %      Immature Grans % 0.4 %      Neutrophils, Absolute 13.29 10*3/mm3      Lymphocytes, Absolute 1.93 10*3/mm3      Monocytes, Absolute 1.08 10*3/mm3      Eosinophils, Absolute 0.01 10*3/mm3      Basophils, Absolute 0.03 10*3/mm3      Immature Grans, Absolute 0.07 10*3/mm3      nRBC 0.0 /100 WBC     Hemoglobin A1c [[id number]]  (Abnormal) Collected: 03/03/22 1442    Specimen: Blood Updated: 03/03/22 1851     Hemoglobin A1C 5.70 %     Narrative:      Hemoglobin A1C Ranges:    Increased Risk for Diabetes  5.7% to 6.4%  Diabetes                     >= 6.5%  Diabetic Goal                < 7.0%    POC Activated Clotting Time [[id number]]  (Abnormal) Collected: 03/03/22 0927    Specimen: Blood Updated: 03/03/22 1715     Activated Clotting Time  249 Seconds      Comment: Serial Number: [id number]:  [id number]       POC Activated Clotting Time [[id number]]  (Abnormal) Collected: 03/03/22 0842    Specimen: Blood Updated: 03/03/22 1715     Activated Clotting Time  356 Seconds      Comment: Serial Number: [id number]:  [id number]       Lipid Panel [[id number]]  (Abnormal) Collected: 03/03/22 1205    Specimen: Blood Updated: 03/03/22 1430     Total Cholesterol 259 mg/dL      Triglycerides 114 mg/dL      HDL Cholesterol 43 mg/dL      LDL Cholesterol  195 mg/dL      VLDL Cholesterol 21 mg/dL      LDL/HDL Ratio 4.49    Narrative:      Cholesterol Reference Ranges  (U.S. Department of Health and Human Services ATP III Classifications)    Desirable          <200 mg/dL  Borderline High    200-239 mg/dL  High Risk          >240 mg/dL      Triglyceride Reference Ranges  (U.S. Department of Health and Human Services ATP III Classifications)    Normal           <150 mg/dL  Borderline High  150-199 mg/dL  High             200-499 mg/dL  Very High        >500 mg/dL    HDL Reference  Ranges  (U.S. Department of Health and Human Services ATP III Classifications)    Low     <40 mg/dl (major risk factor for CHD)  High    >60 mg/dl ('negative' risk factor for CHD)        LDL Reference Ranges  (U.S. Department of Health and Human Services ATP III Classifications)    Optimal          <100 mg/dL  Near Optimal     100-129 mg/dL  Borderline High  130-159 mg/dL  High             160-189 mg/dL  Very High        >189 mg/dL    POC Creatinine [[id number]]  (Normal) Collected: 03/03/22 0843    Specimen: Blood Updated: 03/03/22 1358     Creatinine 0.60 mg/dL      Comment: Serial Number: [id number]:  [id number]       Magnesium [[id number]]  (Normal) Collected: 03/03/22 1205    Specimen: Blood Updated: 03/03/22 1304     Magnesium 1.8 mg/dL     Troponin [[id number]]  (Abnormal) Collected: 03/03/22 1205    Specimen: Blood Updated: 03/03/22 1255     Troponin T 6.120 ng/mL     Narrative:      Troponin T Reference Range:  <= 0.03 ng/mL-   Negative for AMI  >0.03 ng/mL-     Abnormal for myocardial necrosis.  Clinicians would have to utilize clinical acumen, EKG, Troponin and serial changes to determine if it is an Acute Myocardial Infarction or myocardial injury due to an underlying chronic condition.       Results may be falsely decreased if patient taking Biotin.      COVID PRE-OP / PRE-PROCEDURE SCREENING ORDER (NO ISOLATION) - Swab, Nasopharynx [[id number]]  (Normal) Collected: 03/03/22 0857    Specimen: Swab from Nasopharynx Updated: 03/03/22 0917    Narrative:      The following orders were created for panel order COVID PRE-OP / PRE-PROCEDURE SCREENING ORDER (NO ISOLATION) - Swab, Nasopharynx.  Procedure                               Abnormality         Status                     ---------                               -----------         ------                     COVID-19, ABBOTT IN-HOUS...[[id number]]  Normal              Final result                 Please view results for these tests on the individual orders.     COVID-19, ABBOTT IN-HOUSE,NASAL Swab (NO TRANSPORT MEDIA) 2 HR TAT - Swab, Nasopharynx [[id number]]  (Normal) Collected: 03/03/22 0857    Specimen: Swab from Nasopharynx Updated: 03/03/22 0917     COVID19 Presumptive Negative    Narrative:      Fact sheet for providers: https://www.fda.gov/media/404786/download     Fact sheet for patients: https://www.fda.gov/media/079396/download    Test performed by PCR.  If inconsistent with clinical signs and symptoms patient should be tested with different authorized molecular test.          Imaging Results (Last 72 Hours)     ** No results found for the last 72 hours. **        Operative/Procedure Notes (last 72 hours)  Notes from 03/01/22 1437 through 03/04/22 1437   No notes of this type exist for this encounter.            Physician Progress Notes (last 7 days)      Fransisco, IRAM Wagner at 03/04/22 0843     Attestation signed by Jean Cooper IV, MD at 03/04/22 1127    I have reviewed this documentation and agree.    H. C. Ignacio Cooper MD [facility], [facility]  Interventional and General Cardiology                       [facility] - Progress Note    Ritesh Quevedo  1965  [id number]    03/04/22, 08:43 EST      Chief Complaint: Following for CAD.  S/P Inferior MI with PCI of RCA    Subjective:   Patient remains in ICU, stable overnight without symptoms.  Sitting up in bed - has not been up ambulating yet.  Denies chest pain, denies dyspnea.  Not craving cigarettes at this time.  Asking about work release/forms    Review of Systems:  Pertinent positives are listed above and in physical exam.  All others have been reviewed and are negative.    aspirin, 81 mg, Oral, Daily  atorvastatin, 80 mg, Oral, Nightly  metoprolol tartrate, 25 mg, Oral, Q12H  pharmacy consult - MTM, , Does not apply, Daily  Pharmacy Meds to Bed Consult, , Does not apply, Daily  senna-docusate sodium, 2 tablet, Oral, BID  sodium chloride, 10 mL, Intravenous, Q12H  ticagrelor,  "90 mg, Oral, BID  valsartan, 40 mg, Oral, Daily        Objective:  Vitals:   height is 187.2 cm (73.7\") and weight is 101 kg (222 lb 10.6 oz). His oral temperature is 98.3 °F (36.8 °C). His blood pressure is 114/70 and his pulse is 82. His respiration is 16 and oxygen saturation is 91%.       Intake/Output Summary (Last 24 hours) at 3/4/2022 0843  Last data filed at 3/4/2022 0500  Gross per 24 hour   Intake 840 ml   Output 1400 ml   Net -560 ml       Physical Exam:  General:  WN, NAD, A and O x3.  CV:  Normal S1,S2. No murmur, rub, or gallop.  Resp:  CTA Chris, equal, nonlabored.  Abd:  Soft, + BS, no organomegaly. Nontender to palpation.  Extrem:  No edema BLE, 2+ pedal/PT pulses.   Right wrist stable without erythema/bruising.  Dressing personally removed.         Results from last 7 days   Lab Units 03/03/22  1442   WBC 10*3/mm3 16.41*   HEMOGLOBIN g/dL 14.3   HEMATOCRIT % 41.1   PLATELETS 10*3/mm3 244     Results from last 7 days   Lab Units 03/04/22  0425   SODIUM mmol/L 135*   POTASSIUM mmol/L 4.2   CHLORIDE mmol/L 98   CO2 mmol/L 26.0   BUN mg/dL 10   CREATININE mg/dL 0.96   CALCIUM mg/dL 9.3   GLUCOSE mg/dL 109*         Results from last 7 days   Lab Units 03/04/22  0425   TSH uIU/mL 0.767     Results from last 7 days   Lab Units 03/03/22  1205   CHOLESTEROL mg/dL 259*   TRIGLYCERIDES mg/dL 114   HDL CHOL mg/dL 43   LDL CHOL mg/dL 195*           Tele:  NSR    Assessment and Plan:  1.  Inferior MI   -  55 yo CM presents with chest pain, inferior MI by EKG.  S/P successful PCI of distal RCA and RI. EF 50%.   -  Continue DAPT with low dose ASA, Brilinta for a year.  Continue high intensity statin, beta blocker, ARB.   -  Will keep another 24 hours and hopefully home tomorrow.  Per patient request, he would like to follow up with one of our physicians in [facility]  - will arrange 1 month follow up with EKG, Dr. Soto.   -  Cardiac Rehab to consult with patient today.  Ambulate.   -  Gave pt our office number/RN " info to call with work related paperwork.      2.  Hyperlipidemia   -  Lipid panel personally reviewed.   -  Continue high intensity statin and appropriate diet.    3. Tobacco Abuse   -  Cessation education to be provided.   -  At risk for readmission      I discussed the patient's findings and my recommendations with the patient, any present family members, and the nursing staff.       Dorothy Moody PA-C  03/04/22, 08:43 EST  Electronically signed by IRAM Goldsmith, 03/04/22, 9:18 AM EST.                      Electronically signed by Jean Cooper IV, MD at 03/04/22 1127     Zafar Dukes MD at 03/03/22 1028          LIZBETH Samson   APRABDELRAHMAN NOTE    Ritesh Quevedo is a 56 y.o. male who began having substernal chest pain on 3/2/22 which he contributed to gas pain and did not seek care at that time. He woke up on 3/3/22 with crushing and burning chest pain that was worse, rated 7/10 and associated with nausea and vomiting. He presented to the ED at [facility] where EKG showed inferior wall MI.  Code AMI was activated and the patient was flown to [facility] and taken emergently to the cath lab where he underwent PCI with overlapping KYUNG to the distal RCA, mid RCA, proximal RCA, and proximal ramus. LVEF was 50% with inferior akinesis. He was started on a Heparin gtt and given a dose of Brilinta.     He denied dizziness, fatigue, SOA, or changes in functional abilities.  He smokes 1/2 pack a day since he was 14 years old and admits to drinking 1 shot of whiskey every weekend. His father and maternal grandparents had cardiac disease.     On arrival to ICU he is alert, oriented and denies chest pain, nausea, or shortness of air. He has a TR band in place on the right wrist. Right hand and fingers appear dusky with no palpable radial pulse and cap refill is prolonged at > 5 seconds. TR band decreased by 2 mL of air with pulse regained and hand color/cap refill  improving but site began to bleed. 1 mL of air replaced with cessation of bleeding and pulse remains palpable with adequate cap refill.      []   Information obtained by review of electronic health records.(non-face-to-face)  [x]   Information obtained by face-to-face contact with the patient.      INTENSIVIST   HOSPITAL VISIT NOTE     Hospital:  LOS: 0 days     Subjective   S     Ritesh, 56 y.o. male is followed for:No chief complaint on file.       STEMI (ST elevation myocardial infarction) (HCC)    Hyperlipidemia LDL goal <70    Tobacco abuse    As an Intensivist, we provide an integrated approach to the ICU patient and family, medical management of comorbid conditions, including but not limited to electrolytes, glycemic control, organ dysfunction, lead interdisciplinary rounds and coordinate the care with all other services, including those from other specialists.     HPI:  POD: Day of Surgery     Ritesh is a 56 y.o. male, who was transferred to this Hospital from [facility]  on 3/3/2022 because of AMI.     His initial evaluation at the ED suggested and inferior wall MI.    He started having Chest pain this morning.    He is a smoker.    For further details see note above.    I saw him in 2B ICU after his LHC. He is doing much better, still some soreness on his chest. No respiratory distress.    The patient has started, but not completed, their COVID-19 vaccination series.        PMH: He  has a past medical history of Hypertension.   PSxH: He  has a past surgical history that includes Knee surgery.     Medications:  No current facility-administered medications on file prior to encounter.     Current Outpatient Medications on File Prior to Encounter   Medication Sig   • lisinopril (PRINIVIL,ZESTRIL) 10 MG tablet Take 10 mg by mouth Daily.        Allergies: He has No Known Allergies.   FH: His family history includes Heart disease in his father, paternal grandfather, and paternal grandmother.   SH:  He  reports that he has been smoking cigarettes. He has a 21.00 pack-year smoking history. He has never used smokeless tobacco. He reports current alcohol use of about 1.0 standard drink of alcohol per week. He reports that he does not use drugs.     The patient's relevant past medical, surgical and social history were reviewed and updated in Epic as appropriate.        History     Last Reviewed by Zafar Dukes MD on 3/3/2022 at  4:59 PM    Sections Reviewed    Medical, Family, Surgical, Tobacco, Alcohol, Drug Use, Sexual Activity,   Social Documentation      Problem list reviewed by Zafar Dukes MD on 3/3/2022 at  4:59 PM  Medicines reviewed by Zafar Dukes MD on 3/3/2022 at  4:59 PM  Allergies reviewed by Zafar Dukes MD on 3/3/2022 at  4:59 PM       Review of Systems  As described in the HPI. All other systems reviewed and are negative.    Objective   O     Vitals:  Temp: 98.4 °F (36.9 °C) (03/03/22 1615) Temp  Min: 98 °F (36.7 °C)  Max: 98.4 °F (36.9 °C)   Temp core:      BP: 114/77 (03/03/22 1615) BP  Min: 90/63  Max: 156/88   Pulse: 76 (03/03/22 1615) Pulse  Min: 50  Max: 85   Resp: 12 (03/03/22 1615) Resp  Min: 12  Max: 20   SpO2: 95 % (03/03/22 1615) SpO2  Min: 89 %  Max: 98 %   Device: nasal cannula (03/03/22 1615)    Flow Rate: 2 (03/03/22 1615) Flow (L/min)  Min: 2  Max: 3     Intake/Ouptut 24 hrs (7:00AM - 6:59 AM)    Intake/Output Summary (Last 24 hours) at 3/3/2022 1659  Last data filed at 3/3/2022 1116  Gross per 24 hour   Intake --   Output 200 ml   Net -200 ml        Physical Examination    Telemetry:  Rhythm: sinus bradycardia (03/03/22 1600)         Constitutional:  No acute distress.   Head: Normocephalic.   ENMT: No oral lesions.   Neck: No adenopathy. Supple.  Trachea midline.   Cardiovascular: RRR.   Normal heart sounds.  No murmurs, gallop or rub.   Respiratory: Normal breath sounds  No adventitious sounds.   Abdominal:  Soft with no tenderness.  No distension.   No HSM.    Extremities: Warm.  Dry.  No cyanosis.  No Edema   Neurological/Psych:   Alert, Oriented, Cooperative.  Best Eye Response: 4-->(E4) spontaneous (03/03/22 1600)  Best Motor Response: 6-->(M6) obeys commands (03/03/22 1600)  Best Verbal Response: 5-->(V5) oriented (03/03/22 1600)  [place] Coma Scale Score: 15 (03/03/22 1600)     Results Reviewed:  Laboratory  Microbiology  Radiology  Pathology    Hematology:        Chemistry:  Estimated Creatinine Clearance: 169.5 mL/min (by C-G formula based on SCr of 0.6 mg/dL).  Results from last 7 days   Lab Units 03/03/22  0843   CREATININE mg/dL 0.60     Results from last 7 days   Lab Units 03/03/22  1205   MAGNESIUM mg/dL 1.8     Cardiac Labs:  Results from last 7 days   Lab Units 03/03/22  1205   TROPONIN T ng/mL 6.120*     COVID-19  Lab Results   Lab Value Date/Time    COVID19 Presumptive Negative 03/03/2022 0857     Images:  No radiology results for the last day    Echo:    Results: Reviewed.  I reviewed the patient's new laboratory and imaging results.  I independently reviewed the patient's new images.    Medications: Reviewed.    Assessment/Plan   A / P     Ritesh, is a 56 y.o. male admitted on 3/3/2022 with ST elevation myocardial infarction involving right coronary artery (HCC) [I21.11]:     6. STEMI  1. [facility] 03/03/22: Severe 2-vessel CAD (RCA, ramus).    1. Successful PCI of proximal to distal RCA and RPL using overlapping Xience KYUNG.    2. Successful PCI of ramus branch with Xience 3.5 x 23 mm KYUNG  7. HTN on ACEI  8. Dyslipidemia on statins    Lab Results   Lab Value Date/Time    CHOL 259 (H) 03/03/2022 1205    HDL 43 03/03/2022 1205     (H) 03/03/2022 1205    TRIG 114 03/03/2022 1205      9. Tobacco use  10. No history of DM        No results found for: HGBA1C    Advance Directives: Code Status and Medical Interventions:   Ordered at: 03/03/22 1008     Code Status (Patient has no pulse and is not breathing):    CPR (Attempt to Resuscitate)     Medical  Interventions (Patient has pulse or is breathing):    Full Support        Plan:    3. ICU admission after [facility].  4. Monitor arrhythmias.  5. Management of Electrolytes Disorders  6. Tobacco Cessation  7. A1c and TSH in AM  8. Disposition: Keep in ICU.   1. Labs in AM    Plan of care and goals reviewed during interdisciplinary rounds.  I discussed the patient's findings and my recommendations with patient    Level of Risk is High due to:  illness with threat to life or bodily function.     Time: 50 minutes, in direct patient care, with the patient and/or on the loomis coordinating care with other health care providers.     I have spent > 50% percent of this time, counseling and discussing management.     []  Primary Attending  [x]  Consultant      Electronically signed by Zafar Dukes MD at 03/03/22 1704           Electronically signed by Kavitha Charles, RN at 03/04/22 1437     Michelle Winston, RN at 03/04/22 1118          Discharge Planning Assessment  [facility]     Patient Name: Ritesh Quevedo  MRN: [id number]  Today's Date: 3/4/2022    Admit Date: 3/3/2022     Discharge Needs Assessment     Row Name 03/04/22 1114       Living Environment    Lives With alone    Current Living Arrangements home/apartment/condo  Lives in [facility]    Primary Care Provided by self    Provides Primary Care For no one    Family Caregiver if Needed sibling(s)    Family Caregiver Names Tonie-sister    Quality of Family Relationships helpful; involved; supportive    Able to Return to Prior Arrangements yes       Resource/Environmental Concerns    Resource/Environmental Concerns none    Transportation Concerns car, none       Transition Planning    Patient/Family Anticipates Transition to home with family    Patient/Family Anticipated Services at Transition none    Transportation Anticipated family or friend will provide       Discharge Needs Assessment    Equipment Currently Used at Home none    Concerns to be Addressed  discharge planning    Anticipated Changes Related to Illness none    Equipment Needed After Discharge none    Current Discharge Risk lives alone               Discharge Plan     Row Name 03/04/22 1115       Plan    Plan Home    Patient/Family in Agreement with Plan yes    Plan Comments Met with patient and his sister at . Pt lives alone in [facility]. Ind of ADL's and employed. He does not use DME or HH. He does not currently have a PCP and agreeable for CM to arrange. Called and made an appt for 3/11 with Dr. Correa at 12:45 pm, placed in AVS. Pt has no further needs. Family will transport. Michelle ext. [phone]    Final Discharge Disposition Code 01 - home or self-care              Continued Care and Services - Admitted Since 3/3/2022    Coordination has not been started for this encounter.       Expected Discharge Date and Time     Expected Discharge Date Expected Discharge Time    Mar 5, 2022          Demographic Summary     Row Name 03/04/22 1111       General Information    Referral Source admission list    Preferred Language English     Used During This Interaction no    General Information Comments No PCP. No POA. Wants his sister Tonie Joyner to make decisions if needed. Declines healthcare surrogate assistance at this time.       Contact Information    Permission Granted to Share Info With ; family/designee  Sister Tonie               Functional Status     Row Name 03/04/22 1113       Functional Status    Usual Activity Tolerance good    Current Activity Tolerance other (see comments)  See PT notes       Functional Status, IADL    Medications independent    Meal Preparation independent    Housekeeping independent    Laundry independent    Shopping independent       Employment/    Employment Status employed full-time    Employment/ Comments Has [employer] insurance. No issues affording meds.               Psychosocial    No documentation.                 Abuse/Neglect    No documentation.                Legal    No documentation.                Substance Abuse    No documentation.                Patient Forms    No documentation.                   Michelle Winston, RN      Electronically signed by Michelle Winston, RN at 03/04/22 1118     Anna Judd, RN at 03/04/22 1114        Pt. Referred for Phase II Cardiac Rehab. Staff discussed benefits of exercise, program protocol, and educational material provided. Teach back verified.  Permission granted from patient for staff to send referral information to outlying program at this time.  Staff sent electronic referral info to [facility].    Electronically signed by Anna Judd, RN at 03/04/22 1115     Fransisco, IRAM Wagner at 03/04/22 0843     Attestation signed by Jean Cooper IV, MD at 03/04/22 1127    I have reviewed this documentation and agree.    H. MERLIN. Ignacio Cooper MD [facility], [facility]  Interventional and General Cardiology                     [facility] at [facility] - Progress Note    Ritesh Quevedo  1965  [id number] 03/04/22, 08:43 EST      Chief Complaint: Following for CAD.  S/P Inferior MI with PCI of RCA    Subjective:   Patient remains in ICU, stable overnight without symptoms.  Sitting up in bed - has not been up ambulating yet.  Denies chest pain, denies dyspnea.  Not craving cigarettes at this time.  Asking about work release/forms    Review of Systems:  Pertinent positives are listed above and in physical exam.  All others have been reviewed and are negative.    aspirin, 81 mg, Oral, Daily  atorvastatin, 80 mg, Oral, Nightly  metoprolol tartrate, 25 mg, Oral, Q12H  pharmacy consult - [facility], , Does not apply, Daily  Pharmacy Meds to Bed Consult, , Does not apply, Daily  senna-docusate sodium, 2 tablet, Oral, BID  sodium chloride, 10 mL, Intravenous, Q12H  ticagrelor, 90 mg, Oral, BID  valsartan, 40 mg, Oral, Daily        Objective:  Vitals:   height is 187.2 cm  "(73.7\") and weight is 101 kg (222 lb 10.6 oz). His oral temperature is 98.3 °F (36.8 °C). His blood pressure is 114/70 and his pulse is 82. His respiration is 16 and oxygen saturation is 91%.       Intake/Output Summary (Last 24 hours) at 3/4/2022 0843  Last data filed at 3/4/2022 0500  Gross per 24 hour   Intake 840 ml   Output 1400 ml   Net -560 ml       Physical Exam:  General:  WN, NAD, A and O x3.  CV:  Normal S1,S2. No murmur, rub, or gallop.  Resp:  CTA Chris, equal, nonlabored.  Abd:  Soft, + BS, no organomegaly. Nontender to palpation.  Extrem:  No edema BLE, 2+ pedal/PT pulses.   Right wrist stable without erythema/bruising.  Dressing personally removed.         Results from last 7 days   Lab Units 03/03/22  1442   WBC 10*3/mm3 16.41*   HEMOGLOBIN g/dL 14.3   HEMATOCRIT % 41.1   PLATELETS 10*3/mm3 244     Results from last 7 days   Lab Units 03/04/22  0425   SODIUM mmol/L 135*   POTASSIUM mmol/L 4.2   CHLORIDE mmol/L 98   CO2 mmol/L 26.0   BUN mg/dL 10   CREATININE mg/dL 0.96   CALCIUM mg/dL 9.3   GLUCOSE mg/dL 109*         Results from last 7 days   Lab Units 03/04/22  0425   TSH uIU/mL 0.767     Results from last 7 days   Lab Units 03/03/22  1205   CHOLESTEROL mg/dL 259*   TRIGLYCERIDES mg/dL 114   HDL CHOL mg/dL 43   LDL CHOL mg/dL 195*           Tele:  NSR    Assessment and Plan:  1.  Inferior MI   -  55 yo CM presents with chest pain, inferior MI by EKG.  S/P successful PCI of distal RCA and RI. EF 50%.   -  Continue DAPT with low dose ASA, Brilinta for a year.  Continue high intensity statin, beta blocker, ARB.   -  Will keep another 24 hours and hopefully home tomorrow.  Per patient request, he would like to follow up with one of our physicians in [facility]  - will arrange 1 month follow up with EKG, Dr. Soto.   -  Cardiac Rehab to consult with patient today.  Ambulate.   -  Gave pt our office number/RN info to call with work related paperwork.      2.  Hyperlipidemia   -  Lipid panel personally " reviewed.   -  Continue high intensity statin and appropriate diet.    3. Tobacco Abuse   -  Cessation education to be provided.   -  At risk for readmission      I discussed the patient's findings and my recommendations with the patient, any present family members, and the nursing staff.       Dorothy Moody PA-C  03/04/22, 08:43 EST  Electronically signed by IRAM Goldsmith, 03/04/22, 9:18 AM EST.                      Electronically signed by Jean Cooper IV, MD at 03/04/22 1127     Junie Rosa, RN at 03/04/22 0531        Goal Outcome Evaluation:  Plan of Care Reviewed With: patient                Electronically signed by Junie Rosa RN at 03/04/22 0510     Kaushal Curry RN at 03/03/22 6147        Goal Outcome Evaluation:              Outcome Summary: Pt VSS throughout shift. Pt was asymptomatically bradycardic, provider notified. Chest pain has progressively improved this shift, now verbalized a 0-1. Radial site CDI. All questions and concerns adressed at bedside.    Electronically signed by Kaushal Curry RN at 03/03/22 2349     Zafar Dukes MD at 03/03/22 1028          LIZBETH Samson   APRABDELRAHMAN NOTE    Ritesh Quevedo is a 56 y.o. male who began having substernal chest pain on 3/2/22 which he contributed to gas pain and did not seek care at that time. He woke up on 3/3/22 with crushing and burning chest pain that was worse, rated 7/10 and associated with nausea and vomiting. He presented to the ED at [facility] where EKG showed inferior wall MI.  Code AMI was activated and the patient was flown to [facility] and taken emergently to the cath lab where he underwent PCI with overlapping KYUNG to the distal RCA, mid RCA, proximal RCA, and proximal ramus. LVEF was 50% with inferior akinesis. He was started on a Heparin gtt and given a dose of Brilinta.     He denied dizziness, fatigue, SOA, or changes in functional abilities.  He smokes 1/2  pack a day since he was 14 years old and admits to drinking 1 shot of whiskey every weekend. His father and maternal grandparents had cardiac disease.     On arrival to ICU he is alert, oriented and denies chest pain, nausea, or shortness of air. He has a TR band in place on the right wrist. Right hand and fingers appear dusky with no palpable radial pulse and cap refill is prolonged at > 5 seconds. TR band decreased by 2 mL of air with pulse regained and hand color/cap refill improving but site began to bleed. 1 mL of air replaced with cessation of bleeding and pulse remains palpable with adequate cap refill.      []   Information obtained by review of electronic health records.(non-face-to-face)  [x]   Information obtained by face-to-face contact with the patient.      INTENSIVIST   HOSPITAL VISIT NOTE     Hospital:  LOS: 0 days        S     Ritesh, 56 y.o. male is followed for:No chief complaint on file.       STEMI (ST elevation myocardial infarction) (HCC)    Hyperlipidemia LDL goal <70    Tobacco abuse    As an Intensivist, we provide an integrated approach to the ICU patient and family, medical management of comorbid conditions, including but not limited to electrolytes, glycemic control, organ dysfunction, lead interdisciplinary rounds and coordinate the care with all other services, including those from other specialists.     HPI:  POD: Day of Surgery     Ritesh is a 56 y.o. male, who was transferred to this Hospital from [facility]  on 3/3/2022 because of AMI.     His initial evaluation at the ED suggested and inferior wall MI.    He started having Chest pain this morning.    He is a smoker.    For further details see note above.    I saw him in 2B ICU after his LHC. He is doing much better, still some soreness on his chest. No respiratory distress.    The patient has started, but not completed, their COVID-19 vaccination series.        PMH: He  has a past medical history of Hypertension.    PSxH: He  has a past surgical history that includes Knee surgery.     Medications:  No current facility-administered medications on file prior to encounter.     Current Outpatient Medications on File Prior to Encounter   Medication Sig   • lisinopril (PRINIVIL,ZESTRIL) 10 MG tablet Take 10 mg by mouth Daily.        Allergies: He has No Known Allergies.   FH: His family history includes Heart disease in his father, paternal grandfather, and paternal grandmother.   SH: He  reports that he has been smoking cigarettes. He has a 21.00 pack-year smoking history. He has never used smokeless tobacco. He reports current alcohol use of about 1.0 standard drink of alcohol per week. He reports that he does not use drugs.     The patient's relevant past medical, surgical and social history were reviewed and updated in Epic as appropriate.        History     Last Reviewed by Zafar Dukes MD on 3/3/2022 at  4:59 PM    Sections Reviewed    Medical, Family, Surgical, Tobacco, Alcohol, Drug Use, Sexual Activity,   Social Documentation      Problem list reviewed by Zafar Dukes MD on 3/3/2022 at  4:59 PM  Medicines reviewed by Zafar Dukes MD on 3/3/2022 at  4:59 PM  Allergies reviewed by Zafar Dukes MD on 3/3/2022 at  4:59 PM       Review of Systems  As described in the HPI. All other systems reviewed and are negative.       O     Vitals:  Temp: 98.4 °F (36.9 °C) (03/03/22 1615) Temp  Min: 98 °F (36.7 °C)  Max: 98.4 °F (36.9 °C)   Temp core:      BP: 114/77 (03/03/22 1615) BP  Min: 90/63  Max: 156/88   Pulse: 76 (03/03/22 1615) Pulse  Min: 50  Max: 85   Resp: 12 (03/03/22 1615) Resp  Min: 12  Max: 20   SpO2: 95 % (03/03/22 1615) SpO2  Min: 89 %  Max: 98 %   Device: nasal cannula (03/03/22 1615)    Flow Rate: 2 (03/03/22 1615) Flow (L/min)  Min: 2  Max: 3     Intake/Ouptut 24 hrs (7:00AM - 6:59 AM)    Intake/Output Summary (Last 24 hours) at 3/3/2022 1659  Last data filed at 3/3/2022 1116  Gross per 24 hour   Intake --    Output 200 ml   Net -200 ml        Physical Examination    Telemetry:  Rhythm: sinus bradycardia (03/03/22 1600)         Constitutional:  No acute distress.   Head: Normocephalic.   ENMT: No oral lesions.   Neck: No adenopathy. Supple.  Trachea midline.   Cardiovascular: RRR.   Normal heart sounds.  No murmurs, gallop or rub.   Respiratory: Normal breath sounds  No adventitious sounds.   Abdominal:  Soft with no tenderness.  No distension.   No HSM.   Extremities: Warm.  Dry.  No cyanosis.  No Edema   Neurological/Psych:   Alert, Oriented, Cooperative.  Best Eye Response: 4-->(E4) spontaneous (03/03/22 1600)  Best Motor Response: 6-->(M6) obeys commands (03/03/22 1600)  Best Verbal Response: 5-->(V5) oriented (03/03/22 1600)  Josey Coma Scale Score: 15 (03/03/22 1600)     Results Reviewed:  Laboratory  Microbiology  Radiology  Pathology    Hematology:        Chemistry:  Estimated Creatinine Clearance: 169.5 mL/min (by C-G formula based on SCr of 0.6 mg/dL).  Results from last 7 days   Lab Units 03/03/22  0843   CREATININE mg/dL 0.60     Results from last 7 days   Lab Units 03/03/22  1205   MAGNESIUM mg/dL 1.8     Cardiac Labs:  Results from last 7 days   Lab Units 03/03/22  1205   TROPONIN T ng/mL 6.120*     COVID-19  Lab Results   Lab Value Date/Time    COVID19 Presumptive Negative 03/03/2022 0857     Images:  No radiology results for the last day    Echo:    Results: Reviewed.  I reviewed the patient's new laboratory and imaging results.  I independently reviewed the patient's new images.    Medications: Reviewed.    Assessment/Plan   A / P     Ritesh is a 56 y.o. male admitted on 3/3/2022 with ST elevation myocardial infarction involving right coronary artery (HCC) [I21.11]:     11. STEMI  1. [facility] 03/03/22: Severe 2-vessel CAD (RCA, ramus).    1. Successful PCI of proximal to distal RCA and RPL using overlapping Xience KYUNG.    2. Successful PCI of ramus branch with Xience 3.5 x 23 mm KYUNG  12. HTN on  ACEI  13. Dyslipidemia on statins    Lab Results   Lab Value Date/Time    CHOL 259 (H) 03/03/2022 1205    HDL 43 03/03/2022 1205     (H) 03/03/2022 1205    TRIG 114 03/03/2022 1205      14. Tobacco use  15. No history of DM        No results found for: HGBA1C    Advance Directives: Code Status and Medical Interventions:   Ordered at: 03/03/22 1008     Code Status (Patient has no pulse and is not breathing):    CPR (Attempt to Resuscitate)     Medical Interventions (Patient has pulse or is breathing):    Full Support        Plan:    9. ICU admission after LH.  10. Monitor arrhythmias.  11. Management of Electrolytes Disorders  12. Tobacco Cessation  13. A1c and TSH in AM  14. Disposition: Keep in ICU.   1. Labs in AM    Plan of care and goals reviewed during interdisciplinary rounds.  I discussed the patient's findings and my recommendations with patient    Level of Risk is High due to:  illness with threat to life or bodily function.     Time: 50 minutes, in direct patient care, with the patient and/or on the loomis coordinating care with other health care providers.     I have spent > 50% percent of this time, counseling and discussing management.     []  Primary Attending  [x]  Consultant      Electronically signed by Zafar Dukes MD at 03/03/22 2123     Jean Cooper IV, MD at 03/03/22 0789          Cardiology H & P  [facility] Cardiology      Reason for consultation:  · CODE AMI    Requesting provider: [facility] ER    IDENTIFICATION: 56 year old from [facility] Problems    Diagnosis  POA   • **STEMI (ST elevation myocardial infarction) (HCC) [I21.3]  Yes     Priority: High   • Hyperlipidemia LDL goal <70 [E78.5]  Unknown              56-year-old gentleman with no prior cardiac history presented to [facility] ER with 24 hours of substernal chest discomfort.  Discomfort was most notable in his throat and upper chest.  He developed nausea and vomiting.  He  presented to [facility] ER with inferior ST elevation.  Code AMI was activated.  The patient was flown directly to the cardiac catheterization lab at [facility] with ongoing pain, nausea, vomiting but hemodynamically stable.    Cardiac catheterization was performed via the right radial approach.  He was found to have occlusion of the distal RCA which was treated with multiple overlapping drug-eluting stents from proximal to distal vessel.  Additionally, he had a high-grade stenosis of a large ramus intermediate branch.  This was treated with a drug-eluting stent as well.  LVEF 50% with inferior akinesis.        Not on File    Prior to Admission medications    Not on File       History reviewed. No pertinent past medical history.    History reviewed. No pertinent surgical history.    History reviewed. No pertinent family history.    Social History     Tobacco Use   Smoking Status Not on file   Smokeless Tobacco Not on file       Social History     Substance and Sexual Activity   Alcohol Use None         Review of Systems:   Review of Systems   Unable to perform ROS: acuity of condition   Cardiovascular: Positive for chest pain.   Gastrointestinal: Positive for nausea and vomiting.            Vital Sign Min/Max for last 24 hours  No data recorded   BP  Min: 140/86  Max: 140/86   Pulse  Min: 75  Max: 75   Resp  Min: 20  Max: 20   SpO2  Min: 98 %  Max: 98 %   Flow (L/min)  Min: 3  Max: 3    No intake or output data in the 24 hours ending 03/03/22 0932          Constitutional:       Appearance: Healthy appearance. Obese.   Eyes:      General: Lids are normal. No scleral icterus.     Conjunctiva/sclera: Conjunctivae normal.   HENT:      Head: Normocephalic and atraumatic.   Neck:      Thyroid: No thyromegaly.      Vascular: No carotid bruit or JVD.   Pulmonary:      Effort: Pulmonary effort is normal.      Breath sounds: Normal breath sounds. No wheezing. No rhonchi. No rales.   Cardiovascular:      Normal rate. Regular  rhythm.      Murmurs: There is no murmur.      No gallop. No rub.   Pulses:     Intact distal pulses.   Edema:     Peripheral edema absent.   Abdominal:      General: There is no distension.      Palpations: Abdomen is soft. There is no abdominal mass.   Musculoskeletal:      Cervical back: Normal range of motion. Skin:     General: Skin is warm and dry.      Findings: No rash.   Neurological:      General: No focal deficit present.      Mental Status: Alert and oriented to person, place, and time.      Gait: Gait is intact.   Psychiatric:         Attention and Perception: Attention normal.         Mood and Affect: Mood normal.         Behavior: Behavior normal.          Tele: Sinus    DATA REVIEW:    EKG (3/3/2022): Sinus with inferior ST elevation                               Inferior STEMI  · Status post PCI of RCA as well as ramus intermediate branch  · LVEF 50%    Hyperlipidemia goal LDL <70  · We will start atorvastatin 80 mg nightly                 · Admit to ICU  · Metoprolol 25 mg twice daily  · Valsartan 40 mg daily  · Low-dose aspirin and ticagrelor until 3/2023      Electronically signed by Jean Cooper IV, MD, 03/03/22, 7:16 AM EST.        Electronically signed by Jean Cooper IV, MD at 03/03/22 1001         No current facility-administered medications on file as of 3/5/2022.     Lab Results (last 48 hours)     Procedure Component Value Units Date/Time    Basic Metabolic Panel [[id number]]  (Abnormal) Collected: 03/05/22 0533    Specimen: Blood Updated: 03/05/22 0654     Glucose 105 mg/dL      BUN 13 mg/dL      Creatinine 1.13 mg/dL      Sodium 138 mmol/L      Potassium 3.9 mmol/L      Chloride 102 mmol/L      CO2 24.0 mmol/L      Calcium 8.9 mg/dL      BUN/Creatinine Ratio 11.5     Anion Gap 12.0 mmol/L      eGFR 75.3 mL/min/1.73      Comment: National Kidney Foundation and American Society of Nephrology (ASN) Task Force recommended calculation based on the Chronic Kidney  Disease Epidemiology Collaboration (CKD-EPI) equation refit without adjustment for race.       Narrative:      GFR Normal >60  Chronic Kidney Disease <60  Kidney Failure <15      Magnesium [[id number]]  (Normal) Collected: 03/05/22 0533    Specimen: Blood Updated: 03/05/22 0654     Magnesium 2.2 mg/dL     CBC & Differential [[id number]]  (Abnormal) Collected: 03/05/22 0533    Specimen: Blood Updated: 03/05/22 0621    Narrative:      The following orders were created for panel order CBC & Differential.  Procedure                               Abnormality         Status                     ---------                               -----------         ------                     CBC Auto Differential[[id number]]        Abnormal            Final result                 Please view results for these tests on the individual orders.    CBC Auto Differential [[id number]]  (Abnormal) Collected: 03/05/22 0533    Specimen: Blood Updated: 03/05/22 0621     WBC 14.45 10*3/mm3      RBC 4.63 10*6/mm3      Hemoglobin 14.1 g/dL      Hematocrit 42.2 %      MCV 91.1 fL      MCH 30.5 pg      MCHC 33.4 g/dL      RDW 14.3 %      RDW-SD 47.9 fl      MPV 11.0 fL      Platelets 207 10*3/mm3      Neutrophil % 61.9 %      Lymphocyte % 23.7 %      Monocyte % 12.3 %      Eosinophil % 1.1 %      Basophil % 0.4 %      Immature Grans % 0.6 %      Neutrophils, Absolute 8.95 10*3/mm3      Lymphocytes, Absolute 3.42 10*3/mm3      Monocytes, Absolute 1.78 10*3/mm3      Eosinophils, Absolute 0.16 10*3/mm3      Basophils, Absolute 0.06 10*3/mm3      Immature Grans, Absolute 0.08 10*3/mm3      nRBC 0.0 /100 WBC     Magnesium [[id number]]  (Normal) Collected: 03/04/22 0425    Specimen: Blood Updated: 03/04/22 0557     Magnesium 2.4 mg/dL      Comment: Result checked       Basic Metabolic Panel [[id number]]  (Abnormal) Collected: 03/04/22 0425    Specimen: Blood Updated: 03/04/22 0555     Glucose 109 mg/dL      BUN 10 mg/dL      Creatinine 0.96 mg/dL      Sodium 135  mmol/L      Potassium 4.2 mmol/L      Comment: Slight hemolysis detected by analyzer. Results may be affected.        Chloride 98 mmol/L      CO2 26.0 mmol/L      Calcium 9.3 mg/dL      BUN/Creatinine Ratio 10.4     Anion Gap 11.0 mmol/L      eGFR 91.6 mL/min/1.73      Comment: National Kidney Foundation and American Society of Nephrology (ASN) Task Force recommended calculation based on the Chronic Kidney Disease Epidemiology Collaboration (CKD-EPI) equation refit without adjustment for race.       Narrative:      GFR Normal >60  Chronic Kidney Disease <60  Kidney Failure <15      TSH [[id number]]  (Normal) Collected: 03/04/22 0425    Specimen: Blood Updated: 03/04/22 0522     TSH 0.767 uIU/mL     CBC & Differential [[id number]]  (Abnormal) Collected: 03/03/22 1442    Specimen: Blood Updated: 03/03/22 1939    Narrative:      The following orders were created for panel order CBC & Differential.  Procedure                               Abnormality         Status                     ---------                               -----------         ------                     CBC Auto Differential[[id number]]        Abnormal            Final result                 Please view results for these tests on the individual orders.    CBC Auto Differential [[id number]]  (Abnormal) Collected: 03/03/22 1442    Specimen: Blood Updated: 03/03/22 1939     WBC 16.41 10*3/mm3      RBC 4.78 10*6/mm3      Hemoglobin 14.3 g/dL      Hematocrit 41.1 %      MCV 86.0 fL      MCH 29.9 pg      MCHC 34.8 g/dL      RDW 14.1 %      RDW-SD 44.4 fl      MPV 11.2 fL      Platelets 244 10*3/mm3      Neutrophil % 80.9 %      Lymphocyte % 11.8 %      Monocyte % 6.6 %      Eosinophil % 0.1 %      Basophil % 0.2 %      Immature Grans % 0.4 %      Neutrophils, Absolute 13.29 10*3/mm3      Lymphocytes, Absolute 1.93 10*3/mm3      Monocytes, Absolute 1.08 10*3/mm3      Eosinophils, Absolute 0.01 10*3/mm3      Basophils, Absolute 0.03 10*3/mm3      Immature Grans,  Absolute 0.07 10*3/mm3      nRBC 0.0 /100 WBC     Hemoglobin A1c [[id number]]  (Abnormal) Collected: 03/03/22 1442    Specimen: Blood Updated: 03/03/22 1851     Hemoglobin A1C 5.70 %     Narrative:      Hemoglobin A1C Ranges:    Increased Risk for Diabetes  5.7% to 6.4%  Diabetes                     >= 6.5%  Diabetic Goal                < 7.0%    POC Activated Clotting Time [[id number]]  (Abnormal) Collected: 03/03/22 0927    Specimen: Blood Updated: 03/03/22 1715     Activated Clotting Time  249 Seconds      Comment: Serial Number: [id number]:  [id number]       POC Activated Clotting Time [[id number]]  (Abnormal) Collected: 03/03/22 0842    Specimen: Blood Updated: 03/03/22 1715     Activated Clotting Time  356 Seconds      Comment: Serial Number: [id number]:  [id number]       Lipid Panel [[id number]]  (Abnormal) Collected: 03/03/22 1205    Specimen: Blood Updated: 03/03/22 1430     Total Cholesterol 259 mg/dL      Triglycerides 114 mg/dL      HDL Cholesterol 43 mg/dL      LDL Cholesterol  195 mg/dL      VLDL Cholesterol 21 mg/dL      LDL/HDL Ratio 4.49    Narrative:      Cholesterol Reference Ranges  (U.S. Department of Health and Human Services ATP III Classifications)    Desirable          <200 mg/dL  Borderline High    200-239 mg/dL  High Risk          >240 mg/dL      Triglyceride Reference Ranges  (U.S. Department of Health and Human Services ATP III Classifications)    Normal           <150 mg/dL  Borderline High  150-199 mg/dL  High             200-499 mg/dL  Very High        >500 mg/dL    HDL Reference Ranges  (U.S. Department of Health and Human Services ATP III Classifications)    Low     <40 mg/dl (major risk factor for CHD)  High    >60 mg/dl ('negative' risk factor for CHD)        LDL Reference Ranges  (U.S. Department of Health and Human Services ATP III Classifications)    Optimal          <100 mg/dL  Near Optimal     100-129 mg/dL  Borderline High  130-159 mg/dL  High             160-189 mg/dL  Very  High        >189 mg/dL    POC Creatinine [[id number]]  (Normal) Collected: 03/03/22 0843    Specimen: Blood Updated: 03/03/22 1358     Creatinine 0.60 mg/dL      Comment: Serial Number: [id number]:  [id number]       Magnesium [[id number]]  (Normal) Collected: 03/03/22 1205    Specimen: Blood Updated: 03/03/22 1304     Magnesium 1.8 mg/dL     Troponin [[id number]]  (Abnormal) Collected: 03/03/22 1205    Specimen: Blood Updated: 03/03/22 1255     Troponin T 6.120 ng/mL     Narrative:      Troponin T Reference Range:  <= 0.03 ng/mL-   Negative for AMI  >0.03 ng/mL-     Abnormal for myocardial necrosis.  Clinicians would have to utilize clinical acumen, EKG, Troponin and serial changes to determine if it is an Acute Myocardial Infarction or myocardial injury due to an underlying chronic condition.       Results may be falsely decreased if patient taking Biotin.            Imaging Results (Last 48 Hours)     ** No results found for the last 48 hours. **        Operative/Procedure Notes (last 48 hours)  Notes from 03/06/22 1115 through 03/08/22 1115   No notes of this type exist for this encounter.         Physician Progress Notes (last 48 hours)  Notes from 03/06/22 1115 through 03/08/22 1115   No notes of this type exist for this encounter.       Consult Notes (last 48 hours)  Notes from 03/06/22 1115 through 03/08/22 1115   No notes of this type exist for this encounter.

## 2022-03-08 NOTE — DISCHARGE SUMMARY
Discharge Summary  [place], [place]    Patient Name: Ritesh Quevedo  : 1965  MRN: [id number]    Date of Admission: 3/3/2022  Date of Discharge:  3/8/2022    IDENTIFICATION:  Ritesh Quevedo is a 56 y.o. male who lives in [facility]    Diagnosis    • **STEMI (ST elevation myocardial infarction) ([facility], located within [facility])      Cardiac catheterization for inferior STEMI (3/3/2020): Severe 2-vessel CAD (RCA, ramus).  Successful PCI of proximal to distal RCA and RPL using overlapping Xience KYUNG.  Successful PCI of ramus branch with Xience 3.5 x 23 mm KYUNG.     • Hyperlipidemia LDL goal <70    • Tobacco abuse        Summary of Hospitalization:  Patient is a 56-year-old gentleman that had no prior cardiac history who presented to [facility] emergency room on 3/3/2022 with a 24-hour history of substernal chest discomfort associated with nausea and vomiting.  EKG obtained on his arrival showed ST elevation in the inferior leads and code AMI was activated.  He was flown directly to [facility] and taken emergently to the cardiac catheterization lab.  Cardiac catheterization was performed via the right radial approach and coronary angiography showed an occlusion of the distal right coronary artery that was treated with multiple overlapping drug-eluting stents.  He also had a high-grade stenosis of a large ramus branch that was treated with drug-eluting stent placement as well.  Postprocedure he was transferred to the intensive care unit for close monitoring.  His right radial access site remains stable with adequate pulses and without bleeding or hematoma.  He was started on dual antiplatelet therapy with aspirin and Brilinta, statin, beta-blocker and ARB. Echocardiogram performed showed an LVEF of 60% with hypokinesis of the inferior and basal inferior walls.  There was no significant valvular abnormality.  He remained hemodynamically stable and was transferred to  the telemetry floor on 3/4/2022.  He was able to ambulate without difficulty and was ready for discharge home on 3/5/2022.  He will be discharged home on dual antiplatelet therapy with aspirin and Brilinta for 1 year.  Cardiac rehab will be arranged.  He will follow-up with Dr. Soto in [place] in 4 weeks.  Tobacco cessation education was performed.    Procedures Performed  Procedure(s):  Left Heart Cath  Optical Coherent Tomography  Stent KYUNG coronary         Pertinent Test Results:  Results from last 7 days   Lab Units 03/05/22  0533 03/04/22  0425 03/03/22  1442 03/03/22  0843   WBC 10*3/mm3 14.45*  --  16.41*  --    HEMOGLOBIN g/dL 14.1  --  14.3  --    HEMATOCRIT % 42.2  --  41.1  --    PLATELETS 10*3/mm3 207  --  244  --    SODIUM mmol/L 138 135*  --   --    POTASSIUM mmol/L 3.9 4.2  --   --    CHLORIDE mmol/L 102 98  --   --    CO2 mmol/L 24.0 26.0  --   --    BUN mg/dL 13 10  --   --    CREATININE mg/dL 1.13 0.96  --  0.60   GLUCOSE mg/dL 105* 109*  --   --    CALCIUM mg/dL 8.9 9.3  --   --            Invalid input(s): PROT, LABALBU   Results from last 7 days   Lab Units 03/03/22  1205   CHOLESTEROL mg/dL 259*   TRIGLYCERIDES mg/dL 114   HDL CHOL mg/dL 43   LDL CHOL mg/dL 195*     Results from last 7 days   Lab Units 03/04/22  0425 03/03/22  1442   TSH uIU/mL 0.767  --    HEMOGLOBIN A1C %  --  5.70*                Results for orders placed during the hospital encounter of 03/03/22    Adult Transthoracic Echo Complete W/ Cont if Necessary Per Protocol    Interpretation Summary  · Left ventricular systolic function is normal. Estimated left ventricular EF = 60%.  · The following left ventricular wall segments are hypokinetic: mid inferior and basal inferior.  · The cardiac valves are anatomically and functionally normal.        Physical Exam at Discharge    Vital Signs      3/5/2022 temp 98.1, respiratory rate 18, /63    Physical Exam     Physical exam was performed by IRAM Pillai on day of  discharge.  Please refer to her progress     Discharge Disposition  Home or Self Care         Discharge Medications      New Medications      Instructions Start Date   Adult Aspirin Regimen 81 MG EC tablet  Generic drug: aspirin   81 mg, Oral, Daily      atorvastatin 80 MG tablet  Commonly known as: LIPITOR   80 mg, Oral, Nightly      Brilinta 90 MG tablet tablet  Generic drug: ticagrelor   90 mg, Oral, 2 Times Daily      metoprolol tartrate 25 MG tablet  Commonly known as: LOPRESSOR   25 mg, Oral, Every 12 Hours Scheduled      pharmacy consult - MTM   Does not apply, Daily      PHARMACY MEDS TO BED CONSULT   Does not apply, Daily         Continue These Medications      Instructions Start Date   lisinopril 10 MG tablet  Commonly known as: PRINIVIL,ZESTRIL   10 mg, Oral, Daily             Discharge Diet: Healthy heart    Activity at Discharge: As can tolerate    Future Appointments   Date Time Provider Department Center   4/26/2022 11:15 AM Mari Soto MD [facility]     Additional Instructions for the Follow-ups that You Need to Schedule     Ambulatory Referral to Cardiac Rehab   As directed               Electronically signed by LIZBETH Valenzuela, 03/08/22, 7:54 AM EST.    Time: Discharge 20 min

## 2022-03-10 ENCOUNTER — READMISSION MANAGEMENT (OUTPATIENT)
Dept: CALL CENTER | Facility: HOSPITAL | Age: 57
End: 2022-03-10

## 2022-03-10 NOTE — OUTREACH NOTE
AMI Week 1 Survey    Flowsheet Row Responses   [facility] patient discharged from? [place]   Does the patient have one of the following disease processes/diagnoses(primary or secondary)? Acute MI (STEMI,NSTEMI)   Week 1 attempt successful? Yes   Call start time 1119   Call end time 1123   Discharge diagnosis STEMI    Meds reviewed with patient/caregiver? Yes   Is the patient having any side effects they believe may be caused by any medication additions or changes? No   Does the patient have all prescriptions related to this admission filled (includes statins,anticoagulants,HTN meds,anti-arrhythmia meds) Yes   Is the patient taking all medications as directed (includes completed medication regime)? Yes   Comments regarding appointments Cardio  appt made    Does the patient have a primary care provider?  Yes   Does the patient have an appointment with their PCP,cardiologist,or clinic within 7 days of discharge? Yes   Comments regarding PCP 3/11/22   Has the patient kept scheduled appointments due by today? N/A   Did the patient receive a copy of their discharge instructions? Yes   Nursing interventions Reviewed instructions with patient   What is the patient's perception of their health status since discharge? Improving   Nursing interventions Nurse provided patient education   Is the patient/caregiver able to teach back signs and symptoms of when to call for help immediately: Sudden chest discomfort, Sudden discomfort in arms, back, neck or jaw, Shortness of breath at any time, Sudden sweating or clammy skin, Nausea or vomiting, Dizziness or lightheadedness, Irregular or rapid heart rate   Nursing interventions Nurse provided patient education   Is the pateint /caregiver able to teach back the importance of cardiac rehab? Yes   Nursing interventions Provided education on importance of cardiac rehab   Is the patient/caregiver able to teach back lifestyle changes to help prevent MIs Heart healthy diet, Reducing  stress, Quit smoking   Is the patient/caregiver able to teach back ways to prevent a second heart attack: Take medications, Follow up with MD   If the patient is a current smoker, are they able to teach back resources for cessation? Smoking cessation medications   Is the patient/caregiver able to teach back the hierarchy of who to call/visit for symptoms/problems? PCP, Specialist, Home health nurse, Urgent Care, ED, 911 Yes   Week 1 call completed? Yes   Wrap up additional comments Pt reports he has been taking his meds as ordered and has been walking. He will discuss with his Dr how much walking he can do as of right now her is walking a mile a day.           AURELIANO LILLY - Registered Nurse

## 2022-03-14 LAB
QT INTERVAL: 422 MS
QT INTERVAL: 462 MS
QTC INTERVAL: 445 MS
QTC INTERVAL: 477 MS

## 2022-03-18 ENCOUNTER — READMISSION MANAGEMENT (OUTPATIENT)
Dept: CALL CENTER | Facility: HOSPITAL | Age: 57
End: 2022-03-18

## 2022-03-18 NOTE — OUTREACH NOTE
AMI Week 2 Survey    Flowsheet Row Responses   [facility] patient discharged from? [place]   Does the patient have one of the following disease processes/diagnoses(primary or secondary)? Acute MI (STEMI,NSTEMI)   Week 2 attempt successful? Yes   Call start time 1348   Call end time 1350   Discharge diagnosis STEMI    Person spoke with today (if not patient) and relationship Tonie-sister   Meds reviewed with patient/caregiver? Yes   Is the patient taking all medications as directed (includes completed medication regime)? Yes   Has the patient kept scheduled appointments due by today? Yes   Psychosocial issues? No   What is the patient's perception of their health status since discharge? Improving   Week 2 call completed? Yes   Wrap up additional comments Pt is planning to return to work on 3/22/22          JONATHAN LEAL - Registered Nurse

## 2022-03-28 ENCOUNTER — READMISSION MANAGEMENT (OUTPATIENT)
Dept: CALL CENTER | Facility: HOSPITAL | Age: 57
End: 2022-03-28

## 2022-03-28 NOTE — OUTREACH NOTE
AMI Week 3 Survey    Flowsheet Row Responses   [facility] patient discharged from? [place]   Does the patient have one of the following disease processes/diagnoses(primary or secondary)? Acute MI (STEMI,NSTEMI)   Week 3 attempt successful? Yes   Call end time 1044   Discharge diagnosis STEMI    Person spoke with today (if not patient) and relationship Patient   Meds reviewed with patient/caregiver? Yes   Is the patient having any side effects they believe may be caused by any medication additions or changes? No   Does the patient have all prescriptions related to this admission filled (includes statins,anticoagulants,HTN meds,anti-arrhythmia meds) Yes   Is the patient taking all medications as directed (includes completed medication regime)? Yes   Does the patient have a primary care provider?  Yes   Does the patient have an appointment with their PCP,cardiologist,or clinic within 7 days of discharge? Yes   Has the patient kept scheduled appointments due by today? Yes   Psychosocial issues? No   What is the patient's perception of their health status since discharge? Improving   Is the patient/caregiver able to teach back signs and symptoms of when to call for help immediately: Sudden sweating or clammy skin, Sudden discomfort in arms, back, neck or jaw   Is the patient/caregiver able to teach back lifestyle changes to help prevent MIs Quit smoking, Reducing stress, Maintaining a healthy weight, Heart healthy diet   Is the patient/caregiver able to teach back ways to prevent a second heart attack: Take medications, Follow up with MD   Week 3 call completed? Yes   Wrap up additional comments Pt states he is doing better,  pt shares he gets worn out easy after taking medication. Pt educated on s/s of metoprolol, and lipitor. Pt verified cardiologist fu appt on 4/26/22. [employer]/concerns addressed.          QUIRINO PARK - Registered Nurse

## 2022-04-06 ENCOUNTER — READMISSION MANAGEMENT (OUTPATIENT)
Dept: CALL CENTER | Facility: HOSPITAL | Age: 57
End: 2022-04-06

## 2022-04-06 NOTE — OUTREACH NOTE
AMI Week 4 Survey    Flowsheet Row Responses   [facility]? [place]   Does the patient have one of the following disease processes/diagnoses(primary or secondary)? Acute MI (STEMI,NSTEMI)   Week 4 attempt successful? Ludy KEY M - Registered Nurse

## 2022-04-26 ENCOUNTER — OFFICE VISIT (OUTPATIENT)
Dept: CARDIOLOGY | Facility: CLINIC | Age: 57
End: 2022-04-26

## 2022-04-26 VITALS
DIASTOLIC BLOOD PRESSURE: 85 MMHG | HEIGHT: 72 IN | OXYGEN SATURATION: 98 % | HEART RATE: 66 BPM | SYSTOLIC BLOOD PRESSURE: 156 MMHG | WEIGHT: 229 LBS | BODY MASS INDEX: 31.02 KG/M2

## 2022-04-26 DIAGNOSIS — I10 ESSENTIAL HYPERTENSION: ICD-10-CM

## 2022-04-26 DIAGNOSIS — E78.5 HYPERLIPIDEMIA LDL GOAL <70: ICD-10-CM

## 2022-04-26 DIAGNOSIS — I25.10 CORONARY ARTERY DISEASE INVOLVING NATIVE CORONARY ARTERY OF NATIVE HEART WITHOUT ANGINA PECTORIS: Primary | ICD-10-CM

## 2022-04-26 PROCEDURE — 99214 OFFICE O/P EST MOD 30 MIN: CPT

## 2022-04-26 RX ORDER — NITROGLYCERIN 0.4 MG/1
0.4 TABLET SUBLINGUAL
Qty: 60 TABLET | Refills: 3 | Status: SHIPPED | OUTPATIENT
Start: 2022-04-26

## 2022-04-26 RX ORDER — METOPROLOL TARTRATE 50 MG/1
50 TABLET, FILM COATED ORAL EVERY 12 HOURS SCHEDULED
Qty: 90 TABLET | Refills: 3 | Status: SHIPPED | OUTPATIENT
Start: 2022-04-26 | End: 2022-11-22 | Stop reason: ALTCHOICE

## 2022-04-26 NOTE — PROGRESS NOTES
[facility]    Encounter Date: 2022    Patient ID: Ritesh Quevedo is a 56 y.o. male.  : 1965     PCP: Provider, No Known       Chief Complaint: Coronary Artery Disease  PROBLEM LIST:  1. Coronary artery disease  a. Cardiac catheterization for inferior STEMI (3/3/2022): Severe 2-vessel CAD (RCA, ramus).  Successful PCI of proximal to distal RCA and RPL using overlapping Xience KYUNG.  Successful PCI of ramus branch with Xience 3.5 x 23 mm KYUNG.  b. Echocardiogram 2022: LVEF 60%. LV wall segments are hypokinetic: mild and basal inferior. Cardiac valves are anatomically and functionally normal.   2. Hyperlipidemia    History of Present Illness  Patient presents today for a 6 week follow-up with a history of STEMI and cardiac risk factors. On 2022, patient was seen in the [facility] ED for chest discomfort associated with nausea and vomiting. An EKG was obtained which showed ST elevation in the inferior leads. He was then flown directly to [facility] and was immediately taken into the cath lab. Since then, the patient notes he has been relatively well from a cardiac standpoint. He is back to all of his daily activities. He is still experiencing some fatigue but denies any chest pain, shortness of breath, palpitations, orthopnea, or edema. His BP has been well controlled, running 120s-130s. He has not smoked a cigarette since his MI and has cut back from 10-12 Mountain Dew per day to 3-4 Mountain Dew per day.     No Known Allergies       Current Outpatient Medications:   •  aspirin 81 MG EC tablet, Take 1 tablet by mouth Daily., Disp: 90 tablet, Rfl: 4  •  atorvastatin (LIPITOR) 80 MG tablet, Take 1 tablet by mouth Every Night., Disp: 90 tablet, Rfl: 3  •  metoprolol tartrate (LOPRESSOR) 25 MG tablet, Take 1 tablet by mouth Every 12 (Twelve) Hours., Disp: 180 tablet, Rfl: 3  •  pharmacy consult - [place], Daily., Disp: , Rfl:   •  PHARMACY MEDS TO BED  "CONSULT, Daily., Disp: , Rfl:   •  ticagrelor (BRILINTA) 90 MG tablet tablet, Take 1 tablet by mouth 2 (Two) Times a Day., Disp: 60 tablet, Rfl: 11    The following portions of the patient's history were reviewed and updated as appropriate: allergies, current medications, past family history, past medical history, past social history, past surgical history and problem list.    ROS  Review of Systems   Constitution: Negative for chills, fever, fatigue, generalized weakness.   Cardiovascular: Negative for chest pain, dyspnea on exertion, leg swelling, palpitations, orthopnea, and syncope.   Respiratory: Negative for cough, shortness of breath, and wheezing.  HENT: Negative for ear pain, nosebleeds, and tinnitus.  Gastrointestinal: Negative for abdominal pain, constipation, diarrhea, nausea and vomiting.   Genitourinary: No urinary symptoms.  Musculoskeletal: Negative for muscle cramps.  Neurological: Negative for dizziness, headaches, loss of balance, numbness, and symptoms of stroke.  Psychiatric: Normal mental status.     All other systems reviewed and are negative.        Objective:     /85 (BP Location: Left arm, Patient Position: Sitting)   Pulse 66   Ht 182.9 cm (72\")   Wt 104 kg (229 lb)   SpO2 98%   BMI 31.06 kg/m²    BP Recheck 138/78    Physical Exam  Constitutional: Patient appears well-developed and well-nourished.   HENT: HEENT exam unremarkable.   Neck: Neck supple. No JVD present. No carotid bruits.   Cardiovascular: Normal rate, regular rhythm and normal heart sounds. No murmur heard.   2+ symmetric pulses.   Pulmonary/Chest: Breath sounds normal. Does not exhibit tenderness.   Abdominal: Abdomen benign.   Musculoskeletal: Does not exhibit edema.   Neurological: Neurological exam unremarkable.   Vitals reviewed.    Data Review:   Lab Results   Component Value Date    GLUCOSE 105 (H) 03/05/2022    BUN 13 03/05/2022    CREATININE 1.13 03/05/2022    BCR 11.5 03/05/2022    K 3.9 03/05/2022    " CO2 24.0 03/05/2022    CALCIUM 8.9 03/05/2022     Lab Results   Component Value Date    CHOL 259 (H) 03/03/2022    TRIG 114 03/03/2022    HDL 43 03/03/2022     (H) 03/03/2022      Lab Results   Component Value Date    WBC 14.45 (H) 03/05/2022    RBC 4.63 03/05/2022    HGB 14.1 03/05/2022    HCT 42.2 03/05/2022    MCV 91.1 03/05/2022     03/05/2022     Lab Results   Component Value Date    TSH 0.767 03/04/2022     Lab Results   Component Value Date    HGBA1C 5.70 (H) 03/03/2022            Assessment:      Diagnosis Plan   1. Coronary artery disease involving native coronary artery of native heart without angina pectoris  Stable without current angina. Continue ASA, Brilinta, and statin. Will Rx Nitro 0.4mg SL  PRN for angina.    2. Hyperlipidemia LDL goal <70  Recently started on statin therapy. Will repeat lipid panel at next visit   3. Essential hypertension  BP well controlled. Continue current antihypertensive regimen.      Plan:   Stable cardiac status.   Add Nitro 0.4mg SL PRN angina.   Patient has completely stopped smoking.   Continue current medications.   FU in 6 MO, sooner as needed.  Thank you for allowing us to participate in the care of your patient.     Jennifer Trujillo PA-C      Please note that portions of this note may have been completed with a voice recognition program. Efforts were made to edit the dictations, but occasionally words are mistranscribed.

## 2022-11-22 ENCOUNTER — OFFICE VISIT (OUTPATIENT)
Dept: CARDIOLOGY | Facility: CLINIC | Age: 57
End: 2022-11-22

## 2022-11-22 VITALS
BODY MASS INDEX: 33.68 KG/M2 | SYSTOLIC BLOOD PRESSURE: 140 MMHG | WEIGHT: 240.6 LBS | HEART RATE: 69 BPM | OXYGEN SATURATION: 97 % | DIASTOLIC BLOOD PRESSURE: 84 MMHG | HEIGHT: 71 IN

## 2022-11-22 DIAGNOSIS — I10 ESSENTIAL HYPERTENSION: ICD-10-CM

## 2022-11-22 DIAGNOSIS — I25.10 CORONARY ARTERY DISEASE INVOLVING NATIVE CORONARY ARTERY OF NATIVE HEART WITHOUT ANGINA PECTORIS: Primary | ICD-10-CM

## 2022-11-22 DIAGNOSIS — E78.5 HYPERLIPIDEMIA LDL GOAL <70: ICD-10-CM

## 2022-11-22 PROCEDURE — 99214 OFFICE O/P EST MOD 30 MIN: CPT | Performed by: INTERNAL MEDICINE

## 2022-11-22 RX ORDER — CARVEDILOL 25 MG/1
25 TABLET ORAL 2 TIMES DAILY
Qty: 180 TABLET | Refills: 3 | Status: SHIPPED | OUTPATIENT
Start: 2022-11-22

## 2022-11-22 RX ORDER — EZETIMIBE 10 MG/1
10 TABLET ORAL DAILY
Qty: 90 TABLET | Refills: 3 | Status: SHIPPED | OUTPATIENT
Start: 2022-11-22

## 2022-11-22 NOTE — PROGRESS NOTES
[facility]    Encounter Date: 2022    Patient ID: Ritesh Quevedo is a 56 y.o. male.  : 1965     PCP: Provider, No Known       Chief Complaint: No chief complaint on file.      PROBLEM LIST:  1. Coronary artery disease  a. Cardiac catheterization for inferior STEMI (3/3/2022): Severe 2-vessel CAD (RCA, ramus).  Successful PCI of proximal to distal RCA and RPL using overlapping Xience KYUNG.  Successful PCI of ramus branch with Xience 3.5 x 23 mm KYUNG.  b. Echocardiogram 2022: LVEF 60%. LV wall segments are hypokinetic: mild and basal inferior. Cardiac valves are anatomically and functionally normal.   2. Hypertension  3. Hyperlipidemia  4. Hx of tobacco abuse     History of Present Illness  Patient presents today for a follow-up with a history of coronary artery disease and cardiac risk factors. Since last visit, patient has been feeling well from a cardiovascular standpoint. He is a . He states he has gained 11 pounds due to recently quitting smoking. Patient does monitor his blood pressure at home  He notes that he does have pain in his knees. Patient denies chest pain, shortness of breath, palpitations, edema, dizziness, and syncope.      No Known Allergies      Current Outpatient Medications:   •  aspirin 81 MG EC tablet, Take 1 tablet by mouth Daily., Disp: 90 tablet, Rfl: 4  •  atorvastatin (LIPITOR) 80 MG tablet, Take 1 tablet by mouth Every Night., Disp: 90 tablet, Rfl: 3  •  nitroglycerin (Nitrostat) 0.4 MG SL tablet, Place 1 tablet under the tongue Every 5 (Five) Minutes As Needed for Chest Pain. Take no more than 3 doses in 15 minutes., Disp: 60 tablet, Rfl: 3  •  ticagrelor (BRILINTA) 90 MG tablet tablet, Take 1 tablet by mouth 2 (Two) Times a Day., Disp: 60 tablet, Rfl: 11  •  carvedilol (COREG) 25 MG tablet, Take 1 tablet by mouth 2 (Two) Times a Day., Disp: 180 tablet, Rfl: 3  •  ezetimibe (ZETIA) 10 MG tablet, Take 1  "tablet by mouth Daily., Disp: 90 tablet, Rfl: 3    The following portions of the patient's history were reviewed and updated as appropriate: allergies, current medications, past family history, past medical history, past social history, past surgical history and problem list.    ROS  14 point ROS negative except for that listed in the HPI.          Objective:     /84 (BP Location: Left arm, Patient Position: Sitting)   Pulse 69   Ht 180.3 cm (71\")   Wt 109 kg (240 lb 9.6 oz)   SpO2 97%   BMI 33.56 kg/m²    BP repeat: 148/82 mmHg    Physical Exam  Constitutional: Patient appears well-developed and well-nourished.   HENT: HEENT exam unremarkable.   Neck: Neck supple. No JVD present. No carotid bruits.   Cardiovascular: Normal rate, regular rhythm and normal heart sounds. No murmur heard.   2+ symmetric pulses.   Pulmonary/Chest: Breath sounds normal. Does not exhibit tenderness.   Abdominal: Abdomen benign.   Musculoskeletal: Does not exhibit edema.   Neurological: Neurological exam unremarkable.   Vitals reviewed.    Data Review:      Lab date: 11/09/2022  • FLP: , , HDL 44,   • CMP: Glu 117, BUN 10, Creat 1.12, eGFR 67, Na 138, K 138, Cl 105, CO2 29, Ca 9.2, Alk Phos 77, AST 26, ALT 36  • CBC: WBC 10.7, RBC 5.28, HGB 15.3, HCT 44.7, MCV 84.6, MCH 28.9,         Procedures       Assessment:      Diagnosis Plan   1. Coronary artery disease involving native coronary artery of native heart without angina pectoris  Stable without angina on current activity. Continue on aspirin 81 mg for antiplatelet therapy and nitroglycerin 0.4 mg as needed for angina.        2. Essential hypertension  Slightly elevated. Discontinue metoprolol and initiate carvedilol 25 mg BID to better blood pressure. Patient was encouraged to diet and exercise as an attempt to lose at least 20 pounds. Patient is asked to monitor BP at home several times per week and return with written values at next office visit.  "      3. Hyperlipidemia LDL goal <70  Last LDL (11/02/2022): 100. Initiate Zetia 10 mg daily to better cholesterol levels.         Plan:   Discontinue metoprolol and initiate carvedilol 25 mg BID to better blood pressure.   Patient was encouraged to diet and exercise as an attempt to lose at least 20 pounds.   Patient is asked to monitor BP at home several times per week and return with written values at next office visit.  Last LDL (11/02/2022): 100. Initiate Zetia 10 mg daily to better cholesterol levels.  We discussed that in case of persistent elevation we will need to consider PCSK9 inhibitors.  Continue all other current medications.   FU in 3 MO, sooner as needed.  Thank you for allowing us to participate in the care of your patient.       Scribed for Mari Soto MD by Gabby Caruso. 11/22/2022 13:48 EST         I, Mari Soto MD, personally performed the services described in this documentation as scribed by the above named individual in my presence, and it is both accurate and complete.  11/22/2022  13:50 EST      Part of this note may be an electronic transcription/translation of spoken language to printed text using the Dragon Dictation System.

## 2022-11-28 ENCOUNTER — TELEPHONE (OUTPATIENT)
Dept: CARDIOLOGY | Facility: CLINIC | Age: 57
End: 2022-11-28

## 2022-11-28 NOTE — TELEPHONE ENCOUNTER
Clarified with pt which medications he should be on from last office visit with AA. Pt verbalized understanding.

## 2023-02-28 ENCOUNTER — OFFICE VISIT (OUTPATIENT)
Dept: CARDIOLOGY | Facility: CLINIC | Age: 58
End: 2023-02-28
Payer: COMMERCIAL

## 2023-02-28 VITALS
WEIGHT: 244.8 LBS | HEART RATE: 68 BPM | DIASTOLIC BLOOD PRESSURE: 74 MMHG | HEIGHT: 71 IN | OXYGEN SATURATION: 98 % | SYSTOLIC BLOOD PRESSURE: 138 MMHG | BODY MASS INDEX: 34.27 KG/M2

## 2023-02-28 DIAGNOSIS — I25.10 CORONARY ARTERY DISEASE INVOLVING NATIVE CORONARY ARTERY OF NATIVE HEART WITHOUT ANGINA PECTORIS: Primary | ICD-10-CM

## 2023-02-28 DIAGNOSIS — I10 ESSENTIAL HYPERTENSION: ICD-10-CM

## 2023-02-28 DIAGNOSIS — E78.5 HYPERLIPIDEMIA LDL GOAL <70: ICD-10-CM

## 2023-02-28 PROCEDURE — 99214 OFFICE O/P EST MOD 30 MIN: CPT | Performed by: INTERNAL MEDICINE

## 2023-02-28 RX ORDER — CLOPIDOGREL BISULFATE 75 MG/1
75 TABLET ORAL DAILY
Qty: 90 TABLET | Refills: 3 | Status: SHIPPED | OUTPATIENT
Start: 2023-02-28

## 2023-02-28 RX ORDER — VALSARTAN 80 MG/1
80 TABLET ORAL DAILY
Qty: 90 TABLET | Refills: 3 | Status: SHIPPED | OUTPATIENT
Start: 2023-02-28

## 2023-02-28 RX ORDER — VALSARTAN 80 MG/1
TABLET ORAL DAILY
COMMUNITY
Start: 2023-02-08 | End: 2023-02-28 | Stop reason: SDUPTHER

## 2023-02-28 RX ORDER — ATORVASTATIN CALCIUM 80 MG/1
80 TABLET, FILM COATED ORAL NIGHTLY
Qty: 90 TABLET | Refills: 3 | Status: SHIPPED | OUTPATIENT
Start: 2023-02-28

## 2023-02-28 RX ORDER — ASPIRIN 81 MG/1
81 TABLET ORAL DAILY
Qty: 90 TABLET | Refills: 3 | Status: SHIPPED | OUTPATIENT
Start: 2023-02-28

## (undated) DEVICE — TREK CORONARY DILATATION CATHETER 2.50 MM X 15 MM / RAPID-EXCHANGE: Brand: TREK

## (undated) DEVICE — GUIDE CATHETER: Brand: MACH1™

## (undated) DEVICE — PK CATH CARD 10

## (undated) DEVICE — GLIDESHEATH SLENDER STAINLESS STEEL KIT: Brand: GLIDESHEATH SLENDER

## (undated) DEVICE — MODEL BT2000 P/N 700287-012KIT CONTENTS: MANIFOLD WITH SALINE AND CONTRAST PORTS, SALINE TUBING WITH SPIKE AND HAND SYRINGE, TRANSDUCER: Brand: BT2000 AUTOMATED MANIFOLD KIT

## (undated) DEVICE — ST EXT IV SMARTSITE 2VLV SP M LL 5ML IV1

## (undated) DEVICE — DEV INFL MONARCH 25W

## (undated) DEVICE — DEV COMP RAD PRELUDESYNC 24CM

## (undated) DEVICE — CVR TRANSD FLX 3DIMEN 14X29.2CM LF STRL

## (undated) DEVICE — NC TREK™ CORONARY DILATATION CATHETER 4.5 MM X 20 MM / RAPID-EXCHANGE: Brand: NC TREK™

## (undated) DEVICE — CATH DIAG EXPO M/ PK 5F FL4/FR4 PIG

## (undated) DEVICE — Device

## (undated) DEVICE — ST INF PRI SMRTSTE 20DRP 2VLV 24ML 117

## (undated) DEVICE — KT CATH IMG DRAGONFLY OPTIS 2.7F 135CM

## (undated) DEVICE — GW PERIPH GUIDERIGHT STD/EXCHNG/J/TIP SS 0.035IN 5X260CM

## (undated) DEVICE — Device: Brand: ASAHI SION BLUE